# Patient Record
Sex: MALE | Race: WHITE | Employment: UNEMPLOYED | URBAN - METROPOLITAN AREA
[De-identification: names, ages, dates, MRNs, and addresses within clinical notes are randomized per-mention and may not be internally consistent; named-entity substitution may affect disease eponyms.]

---

## 2021-02-08 ENCOUNTER — OFFICE VISIT (OUTPATIENT)
Dept: FAMILY MEDICINE | Facility: CLINIC | Age: 36
End: 2021-02-08
Payer: COMMERCIAL

## 2021-02-08 ENCOUNTER — MYC MEDICAL ADVICE (OUTPATIENT)
Dept: FAMILY MEDICINE | Facility: CLINIC | Age: 36
End: 2021-02-08

## 2021-02-08 VITALS
RESPIRATION RATE: 20 BRPM | WEIGHT: 197.9 LBS | TEMPERATURE: 97 F | HEIGHT: 69 IN | BODY MASS INDEX: 29.31 KG/M2 | OXYGEN SATURATION: 96 % | SYSTOLIC BLOOD PRESSURE: 135 MMHG | HEART RATE: 92 BPM | DIASTOLIC BLOOD PRESSURE: 75 MMHG

## 2021-02-08 DIAGNOSIS — R35.0 URINARY FREQUENCY: ICD-10-CM

## 2021-02-08 DIAGNOSIS — Z00.00 ROUTINE GENERAL MEDICAL EXAMINATION AT A HEALTH CARE FACILITY: Primary | ICD-10-CM

## 2021-02-08 DIAGNOSIS — E66.3 OVERWEIGHT: ICD-10-CM

## 2021-02-08 DIAGNOSIS — R06.83 SNORING: ICD-10-CM

## 2021-02-08 DIAGNOSIS — F17.200 NICOTINE DEPENDENCE, UNCOMPLICATED, UNSPECIFIED NICOTINE PRODUCT TYPE: ICD-10-CM

## 2021-02-08 DIAGNOSIS — Z13.1 SCREENING FOR DIABETES MELLITUS: ICD-10-CM

## 2021-02-08 DIAGNOSIS — Z13.6 CARDIOVASCULAR SCREENING; LDL GOAL LESS THAN 160: ICD-10-CM

## 2021-02-08 DIAGNOSIS — G47.10 EXCESSIVE SLEEPINESS: ICD-10-CM

## 2021-02-08 DIAGNOSIS — R81 GLYCOSURIA: Primary | ICD-10-CM

## 2021-02-08 LAB
ALBUMIN UR-MCNC: NEGATIVE MG/DL
APPEARANCE UR: CLEAR
BILIRUB UR QL STRIP: NEGATIVE
COLOR UR AUTO: YELLOW
GLUCOSE UR STRIP-MCNC: 100 MG/DL
HGB UR QL STRIP: NEGATIVE
KETONES UR STRIP-MCNC: NEGATIVE MG/DL
LEUKOCYTE ESTERASE UR QL STRIP: NEGATIVE
NITRATE UR QL: NEGATIVE
PH UR STRIP: 7 PH (ref 5–7)
SOURCE: ABNORMAL
SP GR UR STRIP: 1.02 (ref 1–1.03)
UROBILINOGEN UR STRIP-ACNC: 0.2 EU/DL (ref 0.2–1)

## 2021-02-08 PROCEDURE — 87086 URINE CULTURE/COLONY COUNT: CPT | Performed by: FAMILY MEDICINE

## 2021-02-08 PROCEDURE — 80061 LIPID PANEL: CPT | Performed by: FAMILY MEDICINE

## 2021-02-08 PROCEDURE — 81003 URINALYSIS AUTO W/O SCOPE: CPT | Performed by: FAMILY MEDICINE

## 2021-02-08 PROCEDURE — 82947 ASSAY GLUCOSE BLOOD QUANT: CPT | Performed by: FAMILY MEDICINE

## 2021-02-08 PROCEDURE — 99213 OFFICE O/P EST LOW 20 MIN: CPT | Mod: 25 | Performed by: FAMILY MEDICINE

## 2021-02-08 PROCEDURE — 99406 BEHAV CHNG SMOKING 3-10 MIN: CPT | Performed by: FAMILY MEDICINE

## 2021-02-08 PROCEDURE — 36415 COLL VENOUS BLD VENIPUNCTURE: CPT | Performed by: FAMILY MEDICINE

## 2021-02-08 PROCEDURE — 99385 PREV VISIT NEW AGE 18-39: CPT | Performed by: FAMILY MEDICINE

## 2021-02-08 RX ORDER — VARENICLINE TARTRATE 1 MG/1
1 TABLET, FILM COATED ORAL 2 TIMES DAILY
Qty: 60 TABLET | Refills: 1 | Status: SHIPPED | OUTPATIENT
Start: 2021-03-10 | End: 2021-04-26

## 2021-02-08 ASSESSMENT — MIFFLIN-ST. JEOR: SCORE: 1805.11

## 2021-02-08 NOTE — PROGRESS NOTES
SUBJECTIVE:   CC: Feng Redding is an 35 year old male who presents for preventative health visit.     Patient has been advised of split billing requirements and indicates understanding: Yes  Healthy Habits:     Getting at least 3 servings of Calcium per day:  Yes    Bi-annual eye exam:  NO    Dental care twice a year:  NO    Sleep apnea or symptoms of sleep apnea:  Daytime drowsiness, Excessive snoring and Sleep apnea    Diet:  Regular (no restrictions)    Frequency of exercise:  None    Taking medications regularly:  Not Applicable    Barriers to taking medications:  Not applicable    Medication side effects:  Not applicable    PHQ-2 Total Score: 0    Additional concerns today:  Yes    Pt requesting referral for sleep study due to excessive snoring / daytime drowsiness/ witnessed apnea.     Pt also c/o urinary frequency/urgency for years, small amounts.  No burning.  No dysuria.     Would like to discuss nicotine cessation (chewing tobacco).  About 3/4 tin/day.  Interested in chantix since has heard good things.               Today's PHQ-2 Score:   PHQ-2 ( 1999 Pfizer) 2/8/2021   Q1: Little interest or pleasure in doing things 0   Q2: Feeling down, depressed or hopeless 0   PHQ-2 Score 0   Q1: Little interest or pleasure in doing things Not at all   Q2: Feeling down, depressed or hopeless Not at all   PHQ-2 Score 0       Abuse: Current or Past(Physical, Sexual or Emotional)- No  Do you feel safe in your environment? Yes        Social History     Tobacco Use     Smoking status: Never Smoker     Smokeless tobacco: Current User     Types: Chew   Substance Use Topics     Alcohol use: Yes     If you drink alcohol do you typically have >3 drinks per day or >7 drinks per week? No    Alcohol Use 2/8/2021   Prescreen: >3 drinks/day or >7 drinks/week? No   Prescreen: >3 drinks/day or >7 drinks/week? -   No flowsheet data found.    Last PSA: No results found for: PSA    Reviewed orders with patient. Reviewed health  "maintenance and updated orders accordingly - Yes      Reviewed and updated as needed this visit by clinical staff  Tobacco  Allergies  Meds   Med Hx  Surg Hx  Fam Hx  Soc Hx        Reviewed and updated as needed this visit by Provider                History reviewed. No pertinent past medical history.   History reviewed. No pertinent surgical history.    Review of Systems  CONSTITUTIONAL: NEGATIVE for fever, chills, change in weight  INTEGUMENTARY/SKIN: NEGATIVE for worrisome rashes, moles or lesions  EYES: NEGATIVE for vision changes or irritation  ENT: NEGATIVE for ear, mouth and throat problems  RESP: NEGATIVE for significant cough or SOB  CV: NEGATIVE for chest pain, palpitations or peripheral edema  GI: NEGATIVE for nausea, abdominal pain, heartburn, or change in bowel habits   male: negative for dysuria, hematuria, decreased urinary stream, erectile dysfunction, urethral discharge  MUSCULOSKELETAL: NEGATIVE for significant arthralgias or myalgia  NEURO: NEGATIVE for weakness, dizziness or paresthesias  PSYCHIATRIC: NEGATIVE for changes in mood or affect    OBJECTIVE:   BP (!) 133/95 (Patient Position: Sitting, Cuff Size: Adult Regular)   Pulse 92   Temp 97  F (36.1  C) (Tympanic)   Resp 20   Ht 1.74 m (5' 8.5\")   Wt 89.8 kg (197 lb 14.4 oz)   SpO2 96%   BMI 29.65 kg/m      Physical Exam  GENERAL: healthy, alert and no distress  EYES: Eyes grossly normal to inspection, PERRL and conjunctivae and sclerae normal  HENT: ear canals and TM's normal, nose and mouth without ulcers or lesions  NECK: no adenopathy, no asymmetry, masses, or scars and thyroid normal to palpation  RESP: lungs clear to auscultation - no rales, rhonchi or wheezes  CV: regular rate and rhythm, normal S1 S2, no S3 or S4, no murmur, click or rub, no peripheral edema and peripheral pulses strong  ABDOMEN: soft, nontender, no hepatosplenomegaly, no masses and bowel sounds normal  MS: no gross musculoskeletal defects noted, no " edema  SKIN: no suspicious lesions or rashes  NEURO: Normal strength and tone, mentation intact and speech normal  PSYCH: mentation appears normal, affect normal/bright        ASSESSMENT/PLAN:   1. Routine general medical examination at a health care facility  Reviewed immunizations, exercise, weight.     2. Nicotine dependence, uncomplicated, unspecified nicotine product type  Reviewed comprehensive smoking cessation counseling today for 15 minutes total time in addition to remainder of visit.     Discussed comprehensive medication options risks/benefits of each.     No mental health problems so chantix a very good option.      Gave pt handout on using chantix, setting quit date, making quit plan, identifying triggers, etc.     Follow up as needed to troubleshoot.      - SMOKING CESSATION COUNSELING 3-10 MIN  - varenicline (CHANTIX STARTING MONTH PAK) 0.5 MG X 11 & 1 MG X 42 tablet; Continue therapy for 3 months.  Dispense: 53 tablet; Refill: 0  - varenicline (CHANTIX CONTINUING MONTH PAK) 1 MG tablet; Take 1 tablet (1 mg) by mouth 2 times daily  Dispense: 60 tablet; Refill: 1    3. Snoring  Definitely agree with sleep study.  Discussed impact on not only alertness but cardiovascular health/weight.   - SLEEP EVALUATION & MANAGEMENT REFERRAL - [facility] [phone] (Age 15 and up); Future    4. Excessive sleepiness    - SLEEP EVALUATION & MANAGEMENT REFERRAL - [facility] [phone] (Age 15 and up); Future    5. Urinary frequency  eval for diabetes/infection.  Decreasing nicoting (bladder irritant) will likely help. Discussed bladder re-training concepts as well.     - Glucose  - *UA reflex to Microscopic  - Urine Culture Aerobic Bacterial    6. Overweight    - Glucose    7. Screening for diabetes mellitus    - Glucose    8. CARDIOVASCULAR SCREENING; LDL GOAL LESS THAN 160    - Lipid panel reflex to direct LDL Fasting    Patient has been advised of  "split billing requirements and indicates understanding: Yes  COUNSELING:   Reviewed preventive health counseling, as reflected in patient instructions       Regular exercise       Healthy diet/nutrition    Estimated body mass index is 29.65 kg/m  as calculated from the following:    Height as of this encounter: 1.74 m (5' 8.5\").    Weight as of this encounter: 89.8 kg (197 lb 14.4 oz).     Weight management plan: Discussed healthy diet and exercise guidelines    He reports that he has never smoked. His smokeless tobacco use includes chew.      Counseling Resources:  ATP IV Guidelines  Pooled Cohorts Equation Calculator  FRAX Risk Assessment  ICSI Preventive Guidelines  Dietary Guidelines for Americans, 2010  USDA's MyPlate  ASA Prophylaxis  Lung CA Screening    Joel Daniel Wegener, MD  [facility]  "

## 2021-02-08 NOTE — PATIENT INSTRUCTIONS
"  Varenicline Tartrate (Chantix):  Chantix tablets Days                Dosage  0.5 mg                     1 through 3 0.5 mg once daily  0.5 mg                      4 through 7 0.5 mg twice daily  1.0 mg                     Day 8 to end  1 mg twice daily    Renal dosing:  For severe renal impairment, start at a dose of 0.5 mg daily and then titrate as needed to a maximum dose of 0.5 mg twice daily.  For patients with end-stage renal disease (ESRD) undergoing hemodialysis, a maximum dose of 0.5 mg daily may be administered if tolerated well.    How to take:  You will start taking Chantix one week before your quit date while you are still smoking. At first you will use the Chantix starter pack. Once you are finished with that, you will be on a \"maintenance dose\" that will probably stay the same for the rest of your treatment.  After a week of slowly increasing your dose, you will take Chantix twice a day. Take each dose after eating and with a full glass of water. Taking Chantix with food and water decreases nausea. That is also why you begin on a lower dose and slowly increase it. Once you begin the twice a day dosage, your 2 doses should be at least 8-10 hours apart and at least 4-5 hours before bedtime.      Duration of therapy: 12 weeks.  An additional course of 12 weeks of treatment is recommended if the first 12 weeks were successful to improve long term abstinence.    Adverse reactions:  The most common adverse reaction associated with varenicline treatment is nausea. Other common adverse effects include sleep disturbance, constipation, flatulence and vomiting.    There have been warnings from the FDA to be on the lookout for erratic behavior, suicidal ideation, or suicidal behavior.   There is one case report of a death, though it appears alcohol consumption may have played a part in that case.    Please report any behavior or mood changes as well as being aware of drowsiness.   Be cautious when operating " motor vehicles or dangerous machinery until the medication's effect on you is clear.    Nicotine Lozenge    Dosing:  >25 cigarettes a day Dose   Weeks 1-6 Use one 4 mg lozenge every 1-2 hours. Maximum 20 lozenges per day.   Weeks 7-9 Use one 4 mg lozenge every 2-4 hours. Maximum 20 lozenges per day.   Weeks 10-12 Use one 4 mg lozenge every 4-8 hours. Maximum 20 lozenges per day.   <25 cigarettes a day    Weeks 1-6 Use one 2 mg lozenge every 1-2 hours. Maximum 20 lozenges per day.   Weeks 7-9 Use one 4 mg lozenge every 2-4 hours. Maximum 20 lozenges per day.   Weeks 10-12 Use one 4 mg lozenge every 4-8 hours. Maximum 20 lozenges per day.     How to use the nicotine lozenge:    Place the lozenge in your mouth and allow it to slowly dissolve. It will be completely dissolved in about 20-30 minutes.     Do not chew or swallow the lozenge. Try to swallow as little as possible. It may help to hold the lozenge in the side of your mouth.     Occasionally move the lozenge from one side of your mouth to another.    You may feel a tingling sensation as you suck on the lozenge.    Especially at the beginning, use the lozenge whenever you would normally smoke a cigarette.    Some Tips:    Do not smoke while you are using the nicotine lozenge.     Do not use the nicotine lozenge like a normal lozenge. If you swallow, you will not absorb the nicotine and you may experience more side effects such as stomach upset.    Do not drink anything, even water, while you are using the lozenge or for 15 minutes before or after.    Do not use more than 1 lozenge at one time.    Do not use more than 5 lozenges in 6 hours.    Using at least 9 lozenges per day during your first 6 weeks will increase your chance of quitting.    As your body becomes less dependent on nicotine, you will need to use less lozenges.    Follow up with your health care professional if you have questions and also to help taper your nicotine lozenge dose.    Side  Effects:  Some people may experience some indigestion or nausea. Using the lozenge properly may help. If you experience any other troublesome or unusual side effects, call your health care professional.

## 2021-02-09 LAB
BACTERIA SPEC CULT: NO GROWTH
CHOLEST SERPL-MCNC: 265 MG/DL
GLUCOSE SERPL-MCNC: 98 MG/DL (ref 70–99)
HDLC SERPL-MCNC: 46 MG/DL
LDLC SERPL CALC-MCNC: 184 MG/DL
Lab: NORMAL
NONHDLC SERPL-MCNC: 219 MG/DL
SPECIMEN SOURCE: NORMAL
TRIGL SERPL-MCNC: 176 MG/DL

## 2021-02-12 ENCOUNTER — MYC MEDICAL ADVICE (OUTPATIENT)
Dept: FAMILY MEDICINE | Facility: CLINIC | Age: 36
End: 2021-02-12

## 2021-02-17 DIAGNOSIS — R81 GLYCOSURIA: ICD-10-CM

## 2021-02-17 DIAGNOSIS — R35.0 URINARY FREQUENCY: ICD-10-CM

## 2021-02-17 LAB
ALBUMIN SERPL-MCNC: 3.9 G/DL (ref 3.4–5)
ALP SERPL-CCNC: 85 U/L (ref 40–150)
ALT SERPL W P-5'-P-CCNC: 51 U/L (ref 0–70)
ANION GAP SERPL CALCULATED.3IONS-SCNC: 6 MMOL/L (ref 3–14)
AST SERPL W P-5'-P-CCNC: 24 U/L (ref 0–45)
BILIRUB SERPL-MCNC: 0.6 MG/DL (ref 0.2–1.3)
BUN SERPL-MCNC: 20 MG/DL (ref 7–30)
CALCIUM SERPL-MCNC: 9.3 MG/DL (ref 8.5–10.1)
CHLORIDE SERPL-SCNC: 107 MMOL/L (ref 94–109)
CO2 SERPL-SCNC: 26 MMOL/L (ref 20–32)
CREAT SERPL-MCNC: 1.04 MG/DL (ref 0.66–1.25)
GFR SERPL CREATININE-BSD FRML MDRD: >90 ML/MIN/{1.73_M2}
GLUCOSE SERPL-MCNC: 93 MG/DL (ref 70–99)
HBA1C MFR BLD: 5.2 % (ref 0–5.6)
POTASSIUM SERPL-SCNC: 4.4 MMOL/L (ref 3.4–5.3)
PROT SERPL-MCNC: 7.6 G/DL (ref 6.8–8.8)
SODIUM SERPL-SCNC: 139 MMOL/L (ref 133–144)

## 2021-02-17 PROCEDURE — 36415 COLL VENOUS BLD VENIPUNCTURE: CPT | Performed by: FAMILY MEDICINE

## 2021-02-17 PROCEDURE — 80053 COMPREHEN METABOLIC PANEL: CPT | Performed by: FAMILY MEDICINE

## 2021-02-17 PROCEDURE — 83036 HEMOGLOBIN GLYCOSYLATED A1C: CPT | Performed by: FAMILY MEDICINE

## 2021-03-14 ENCOUNTER — HEALTH MAINTENANCE LETTER (OUTPATIENT)
Age: 36
End: 2021-03-14

## 2021-03-16 VITALS — BODY MASS INDEX: 27.2 KG/M2 | HEIGHT: 70 IN | WEIGHT: 190 LBS

## 2021-03-16 ASSESSMENT — MIFFLIN-ST. JEOR: SCORE: 1793.08
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
## 2021-03-16 NOTE — PATIENT INSTRUCTIONS
"MY TREATMENT INFORMATION FOR SLEEP APNEA-  [place] [place]    DOCTOR : Janett Mercedes MD    Am I having a sleep study at a sleep center? Not ordered  --->Due to insurance clearance delays, you will be contacted within 2-4 weeks to schedule    Am I having a home sleep study? ordered  --->Watch the video for the device you are using:  /drop off device-   Https://www.Selfie.com.com/watch?v=yGGFBdELGhk  Disposable device sent out require phone/computer application-   https://www.Selfie.com.com/watch?v=BCce_vbiwxE      Frequently asked questions:  1. What is Obstructive Sleep Apnea (LESLIE)? LESLIE is the most common type of sleep apnea. Apnea means, \"without breath.\"  Apnea is most often caused by narrowing or collapse of the upper airway as muscles relax during sleep.   Almost everyone has occasional apneas. Most people with sleep apnea have had brief interruptions at night frequently for many years.  The severity of sleep apnea is related to how frequent and severe the events are.   2. What are the consequences of LESLIE? Symptoms include: feeling sleepy during the day, snoring loudly, gasping or stopping of breathing, trouble sleeping, and occasionally morning headaches or heartburn at night.  Sleepiness can be serious and even increase the risk of falling asleep while driving. Other health consequences may include development of high blood pressure and other cardiovascular disease in persons who are susceptible. Untreated LESLIE  can contribute to heart disease, stroke and diabetes.   3. What are the treatment options? In most situations, sleep apnea is a lifelong disease that must be managed with daily therapy. Medications are not effective for sleep apnea and surgery is generally not considered until other therapies have been tried. Your treatment is your choice . Continuous Positive Airway (CPAP) works right away and is the therapy that is effective in nearly everyone. An oral device to hold your jaw forward is " usually the next most reliable option. Other options include postioning devices (to keep you off your back), weight loss, and surgery including a tongue pacing device. There is more detail about some of these options below.  4. Are my sleep studies covered by insurance? Although we will request verification of coverage, we advise you also check in advance of the study to ensure there is coverage.    Important tips for those choosing CPAP and similar devices   Know your equipment:  CPAP is continuous positive airway pressure that prevents obstructive sleep apnea by keeping the throat from collapsing while you are sleeping. In most cases, the device is  smart  and can slowly self-adjusts if your throat collapses and keeps a record every day of how well you are treated-this information is available to you and your care team.  BPAP is bilevel positive airway pressure that keeps your throat open and also assists each breath with a pressure boost to maintain adequate breathing.  Special kinds of BPAP are used in patients who have inadequate breathing from lung or heart disease. In most cases, the device is  smart  and can slowly self-adjusts to assist breathing. Like CPAP, the device keeps a record of how well you are treated.  Your mask is your connection to the device. You get to choose what feels most comfortable and the staff will help to make sure if fits. Here: are some examples of the different masks that are available:       Key points to remember on your journey with sleep apnea:  1. Sleep study.  PAP devices often need to be adjusted during a sleep study to show that they are effective and adjusted right.  2. Good tips to remember: Try wearing just the mask during a quiet time during the day so your body adapts to wearing it. A humidifier is recommended for comfort in most cases to prevent drying of your nose and throat. Allergy medication from your provider may help you if you are having nasal  congestion.  3. Getting settled-in. It takes more than one night for most of us to get used to wearing a mask. Try wearing just the mask during a quiet time during the day so your body adapts to wearing it. A humidifier is recommended for comfort in most cases. Our team will work with you carefully on the first day and will be in contact within 4 days and again at 2 and 4 weeks for advice and remote device adjustments. Your therapy is evaluated by the device each day.   4. Use it every night. The more you are able to sleep naturally for 7-8 hours, the more likely you will have good sleep and to prevent health risks or symptoms from sleep apnea. Even if you use it 4 hours it helps. Occasionally all of us are unable to use a medical therapy, in sleep apnea, it is not dangerous to miss one night.   5. Communicate. Call our skilled team on the number provided on the first day if your visit for problems that make it difficult to wear the device. Over 2 out of 3 patients can learn to wear the device long-term with help from our team. Remember to call our team or your sleep providers if you are unable to wear the device as we may have other solutions for those who cannot adapt to mask CPAP therapy. It is recommended that you sleep your sleep provider within the first 3 months and yearly after that if you are not having problems.   6. Use it for your health. We encourage use of CPAP masks during daytime quiet periods to allow your face and brain to adapt to the sensation of CPAP so that it will be a more natural sensation to awaken to at night or during naps. This can be very useful during the first few weeks or months of adapting to CPAP though it does not help medically to wear CPAP during wakefulness and  should not be used as a strategy just to meet guidelines.  7. Take care of your equipment. Make sure you clean your mask and tubing using directions every day and that your filter and mask are replaced as recommended or  if they are not working.     BESIDES CPAP, WHAT OTHER THERAPIES ARE THERE?    Positioning Device  Positioning devices are generally used when sleep apnea is mild and only occurs on your back.This example shows a pillow that straps around the waist. It may be appropriate for those whose sleep study shows milder sleep apnea that occurs primarily when lying flat on one's back. Preliminary studies have shown benefit but effectiveness at home may need to be verified by a home sleep test. These devices are generally not covered by medical insurance.  Examples of devices that maintain sleeping on the back to prevent snoring and mild sleep apnea.    Belt type body positioner  http://charity: water/    Electronic reminder  http://nightshifttherapy.com/  http://www.SinCola.FitBark.au/      Oral Appliance  What is oral appliance therapy?  An oral appliance device fits on your teeth at night like a retainer used after having braces. The device is made by a specialized dentist and requires several visits over 1-2 months before a manufactured device is made to fit your teeth and is adjusted to prevent your sleep apnea. Once an oral device is working properly, snoring should be improved. A home sleep test may be recommended at that time if to determine whether the sleep apnea is adequately treated.       Some things to remember:  -Oral devices are often, but not always, covered by your medical insurance. Be sure to check with your insurance provider.   -If you are referred for oral therapy, you will be given a list of specialized dentists to consider or you may choose to visit the Web site of the American Academy of Dental Sleep Medicine  -Oral devices are less likely to work if you have severe sleep apnea or are extremely overweight.     More detailed information  An oral appliance is a small acrylic device that fits over the upper and lower teeth  (similar to a retainer or a mouth guard). This device slightly moves jaw forward, which  moves the base of the tongue forward, opens the airway, improves breathing for effective treat snoring and obstructive sleep apnea in perhaps 7 out of 10 people .  The best working devices are custom-made by a dental device  after a mold is made of the teeth 1, 2, 3.  When is an oral appliance indicated?  Oral appliance therapy is recommended as a first-line treatment for patients with primary snoring, mild sleep apnea, and for patients with moderate sleep apnea who prefer appliance therapy to use of CPAP4, 5. Severity of sleep apnea is determined by sleep testing and is based on the number of respiratory events per hour of sleep.   How successful is oral appliance therapy?  The success rate of oral appliance therapy in patients with mild sleep apnea is 75-80% while in patients with moderate sleep apnea it is 50-70%. The chance of success in patients with severe sleep apnea is 40-50%. The research also shows that oral appliances have a beneficial effect on the cardiovascular health of LESLIE patients at the same magnitude as CPAP therapy7.  Oral appliances should be a second-line treatment in cases of severe sleep apnea, but if not completely successful then a combination therapy utilizing CPAP plus oral appliance therapy may be effective. Oral appliances tend to be effective in a broad range of patients although studies show that the patients who have the highest success are females, younger patients, those with milder disease, and less severe obesity. 3, 6.   Finding a dentist that practices dental sleep medicine  Specific training is available through the American Academy of Dental Sleep Medicine for dentists interested in working in the field of sleep. To find a dentist who is educated in the field of sleep and the use of oral appliances, near you, visit the Web site of the American Academy of Dental Sleep Medicine.    References  1. janelle Roche al. Objectively measured vs self-reported compliance  during oral appliance therapy for sleep-disordered breathing. Chest 2013; 144(5): 1957-9650.  2. Reena, et al. Objective measurement of compliance during oral appliance therapy for sleep-disordered breathing. Thorax 2013; 68(1): 91-96.  3. Liana et al. Mandibular advancement devices in 620 men and women with LESLIE and snoring: tolerability and predictors of treatment success. Chest 2004; 125: 5497-4135.  4. Spenser et al. Oral appliances for snoring and LESLIE: a review. Sleep 2006; 29: 244-262.  5. Ani et al. Oral appliance treatment for LESLIE: an update. J Clin Sleep Med 2014; 10(2): 215-227.  6. Waldemar et al. Predictors of OSAH treatment outcome. J Dent Res 2007; 86: 5605-0052.      Weight Loss:    Weight loss is a long-term strategy that may improve sleep apnea in some patients.      Surgery:    Surgery for obstructive sleep apnea is considered generally only when other therapies fail to work. Surgery may be discussed with you if you are having a difficult time tolerating CPAP and or when there is an abnormal structure that requires surgical correction.  Nose and throat surgeries often enlarge the airway to prevent collapse.  Most of these surgeries create pain for 1-2 weeks and up to half of the most common surgeries are not effective throughout life.  You should carefully discuss the benefits and drawbacks to surgery with your sleep provider and surgeon to determine if it is the best solution for you.   More information  Surgery for LESLIE is directed at areas that are responsible for narrowing or complete obstruction of the airway during sleep.  There are a wide range of procedures available to enlarge and/or stabilize the airway to prevent blockage of breathing in the three major areas where it can occur: the palate, tongue, and nasal regions.  Successful surgical treatment depends on the accurate identification of the factors responsible for obstructive sleep apnea in each person.  A  personalized approach is required because there is no single treatment that works well for everyone.  Because of anatomic variation, consultation with an examination by a sleep surgeon is a critical first step in determining what surgical options are best for each patient.  In some cases, examination during sedation may be recommended in order to guide the selection of procedures.  Patients will be counseled about risks and benefits as well as the typical recovery course after surgery. Surgery is typically not a cure for a person s LESLIE.  However, surgery will often significantly improve one s LESLIE severity (termed  success rate ).  Even in the absence of a cure, surgery will decrease the cardiovascular risk associated with OSA7; improve overall quality of life8 (sleepiness, functionality, sleep quality, etc).      Palate Procedures:  Patients with LESLIE often have narrowing of their airway in the region of their tonsils and uvula.  The goals of palate procedures are to widen the airway in this region as well as to help the tissues resist collapse.  Modern palate procedure techniques focus on tissue conservation and soft tissue rearrangement, rather than tissue removal.  Often the uvula is preserved in this procedure. Residual sleep apnea is common in patient after pharyngoplasty with an average reduction in sleep apnea events of 33%2.      Tongue Procedures:  ExamWhile patients are awake, the muscles that surround the throat are active and keep this region open for breathing. These muscles relax during sleep, allowing the tongue and other structures to collapse and block breathing.  There are several different tongue procedures available.  Selection of a tongue base procedure depends on characteristics seen on physical exam.  Generally, procedures are aimed at removing bulky tissues in this area or preventing the back of the tongue from falling back during sleep.  Success rates for tongue surgery range from  50-62%3.    Hypoglossal Nerve Stimulation:  Hypoglossal nerve stimulation has recently received approval from the [place] Food and Drug Administration for the treatment of obstructive sleep apnea.  This is based on research showing that the system was safe and effective in treating sleep apnea6.  Results showed that the median AHI score decreased 68%, from 29.3 to 9.0. This therapy uses an implant system that senses breathing patterns and delivers mild stimulation to airway muscles, which keeps the airway open during sleep.  The system consists of three fully implanted components: a small generator (similar in size to a pacemaker), a breathing sensor, and a stimulation lead.  Using a small handheld remote, a patient turns the therapy on before bed and off upon awakening.    Candidates for this device must be greater than 22 years of age, have moderate to severe LESLIE (AHI between 20-65), BMI less than 32, have tried CPAP/oral appliance without success, and have appropriate upper airway anatomy (determined by a sleep endoscopy performed by Dr. Saldana).    Hypoglossal Nerve Stimulation Pathway:    The sleep surgeon s office will work with the patient through the insurance prior-authorization process (including communications and appeals).    Nasal Procedures:  Nasal obstruction can interfere with nasal breathing during the day and night.  Studies have shown that relief of nasal obstruction can improve the ability of some patients to tolerate positive airway pressure therapy for obstructive sleep apnea1.  Treatment options include medications such as nasal saline, topical corticosteroid and antihistamine sprays, and oral medications such as antihistamines or decongestants. Non-surgical treatments can include external nasal dilators for selected patients. If these are not successful by themselves, surgery can improve the nasal airway either alone or in combination with these other options.      Combination  Procedures:  Combination of surgical procedures and other treatments may be recommended, particularly if patients have more than one area of narrowing or persistent positional disease.  The success rate of combination surgery ranges from 66-80%2,3.    References  1. Jesus FULTON. The Role of the Nose in Snoring and Obstructive Sleep Apnoea: An Update.  Eur Arch Otorhinolaryngol. 2011; 268: 1365-73.  2.  Maurisio SM; Aurora JA; Sherry JR; Pallanch JF; Jon MB; Anthony SG; Juan DE LA ROSA. Surgical modifications of the upper airway for obstructive sleep apnea in adults: a systematic review and meta-analysis. SLEEP 2010;33(10):0744-9772. Dawn CROCKETT. Hypopharyngeal surgery in obstructive sleep apnea: an evidence-based medicine review.  Arch Otolaryngol Head Neck Surg. 2006 Feb;132(2):206-13.  3. Memo YH1, Micki Y, Oh ELIOT. The efficacy of anatomically based multilevel surgery for obstructive sleep apnea. Otolaryngol Head Neck Surg. 2003 Oct;129(4):327-35.  4. Dawn CROCKETT, Goldberg A. Hypopharyngeal Surgery in Obstructive Sleep Apnea: An Evidence-Based Medicine Review. Arch Otolaryngol Head Neck Surg. 2006 Feb;132(2):206-13.  5. Lay HANNA et al. Upper-Airway Stimulation for Obstructive Sleep Apnea.  N Engl J Med. 2014 Jan 9;370(2):139-49.  6. Karley Y et al. Increased Incidence of Cardiovascular Disease in Middle-aged Men with Obstructive Sleep Apnea. Am J Respir Crit Care Med; 2002 166: 159-165  7. Luis EM et al. Studying Life Effects and Effectiveness of Palatopharyngoplasty (SLEEP) study: Subjective Outcomes of Isolated Uvulopalatopharyngoplasty. Otolaryngol Head Neck Surg. 2011; 144: 623-631.              Your BMI is Body mass index is 27.26 kg/m .  Weight management is a personal decision.  If you are interested in exploring weight loss strategies, the following discussion covers the approaches that may be successful. Body mass index (BMI) is one way to tell whether you are at a healthy weight, overweight, or obese. It  measures your weight in relation to your height.  A BMI of 18.5 to 24.9 is in the healthy range. A person with a BMI of 25 to 29.9 is considered overweight, and someone with a BMI of 30 or greater is considered obese. More than two-thirds of American adults are considered overweight or obese.  Being overweight or obese increases the risk for further weight gain. Excess weight may lead to heart disease and diabetes.  Creating and following plans for healthy eating and physical activity may help you improve your health.  Weight control is part of healthy lifestyle and includes exercise, emotional health, and healthy eating habits. Careful eating habits lifelong are the mainstay of weight control. Though there are significant health benefits from weight loss, long-term weight loss with diet alone may be very difficult to achieve- studies show long-term success with dietary management in less than 10% of people. Attaining a healthy weight may be especially difficult to achieve in those with severe obesity. In some cases, medications, devices and surgical management might be considered.  What can you do?  If you are overweight or obese and are interested in methods for weight loss, you should discuss this with your provider.     Consider reducing daily calorie intake by 500 calories.     Keep a food journal.     Avoiding skipping meals, consider cutting portions instead.    Diet combined with exercise helps maintain muscle while optimizing fat loss. Strength training is particularly important for building and maintaining muscle mass. Exercise helps reduce stress, increase energy, and improves fitness. Increasing exercise without diet control, however, may not burn enough calories to loose weight.       Start walking three days a week 10-20 minutes at a time    Work towards walking thirty minutes five days a week     Eventually, increase the speed of your walking for 1-2 minutes at time    In addition, we recommend that  "you review healthy lifestyles and methods for weight loss available through the National Institutes of Health patient information sites:  http://win.niddk.nih.gov/publications/index.htm    And look into health and wellness programs that may be available through your health insurance provider, employer, local community center, or [employer].    Weight management plan: Patient was referred to their PCP to discuss a diet and exercise plan.    STRESS AND SLEEP    Its no surprise that stress can affect sleep. It is also true that if you sleep better you can also handle stress better.  If left untreated, sleep disorders are also associated with increased risk of onset of depression and anxiety and other health risks.    During this disruptive and uncertain time in our lives due the coronavirus (COVID-19), it is especially important to tend to your emotional and physical wellbeing.   Here are a few tips and resources to consider:    1. Stay active    It s pretty well known that exercise is really good for both our physical and mental health. There s heaps of different types of exercise you can do from home, thanks to YouTube and apps.  Do what fits your needs and health circumstances.    2. Practice good sleep habits      Keep a consistent sleep schedule and allow 7-8 hours for sleep.    Only use your bed for sleep and sex.    Wind down before bed.    Avoid using electronics and mobile devices an hour before bedtime or in bed.    Avoid caffeine use within six hours of your bedtime.     Avoid alcohol use within three hours of your bedtime.    2. Take 5 to chill.    When we re stressed about something (such as coronavirus), our thoughts, heart-rate and breathing tend to speed up. Taking even 5 minutes or so to practice mindfulness can help produce a sense of calmness. What the heck is \"mindfulness\" you ask?  Basically it is any activity, be it meditation, sitting in a relaxed position and taking in the experience and " "sensations of the present moment, or enjoying relaxed breathing.    If you are experiencing insomnia, the free [employer]  mobile obi has great relaxation tools in their Tools section including:      Breathing Tool    Progressive Muscle Relaxation    Body Scan    Mindfulness Exercise    Guided Imagery ([place], [place] or [place])    Insight Timer and Calm apps have thousands of free guided meditations and exercise on a variety of topics that may be of interest such as managing stress, sleep, and meditation.    3. Chat with your friends and family.    Even if an in-person meet-up is off the table, try to stay in touch via text, Skype, Messenger, BigEvidencepp, ModeWalk, or phone call. Ask them how they re feeling and share your own experience if you feel safe to do so.    5. Take a break from the news    Between the news and social media, we are all feeling saturated by news updates and \"breaking news.\"  It s important to stay informed, but try to limit your media intake to a couple of times a day and use trusted news sources. If you catch yourself turning to social media because you re feeling isolated, take a break and spend time on another activity.    6. Listen to Music    Music can make us feel so much better. If you don't have a music playlist on your phone, consider making one.     7. Watch or read something uplifting    Distraction can be a good thing. Watch something that you find uplifting and allow yourself to zone out from what s going on in the world. [employer] is a great option too.     8. Learn something new    Have you wanted to get into drawing or learning a musical instrument? Now s a great time to make a start. [employer] has great free online tutorials for pretty much everything.    Helpful Advice from the Sleep Foundation    Copy/paste or type into your browser:    www.sleepfoundation.org/sleep-guidelines-covid-19-isolation      Stress Management and Relaxation Books      The Relaxation and Stress " Reduction Workbook by Rosie Quinn and Gama Lugo (2008)    Stress Management Workbook: Techniques and Self-Assessment Procedures by Adelita Dykes and Segun Dias (1997)    A Mindfulness-Based Stress Reduction Workbook by Mart Rivera and Dianne Hensley (2010)    The Complete Stress Management Workbook by Johnson Escamilla, Jens Lopez and Brain Bunch (1996)

## 2021-03-17 ENCOUNTER — VIRTUAL VISIT (OUTPATIENT)
Dept: SLEEP MEDICINE | Facility: CLINIC | Age: 36
End: 2021-03-17
Attending: FAMILY MEDICINE
Payer: COMMERCIAL

## 2021-03-17 DIAGNOSIS — G47.09 OTHER INSOMNIA: ICD-10-CM

## 2021-03-17 DIAGNOSIS — E66.3 OVERWEIGHT (BMI 25.0-29.9): ICD-10-CM

## 2021-03-17 DIAGNOSIS — Z91.89 RISK FACTORS FOR OBSTRUCTIVE SLEEP APNEA: ICD-10-CM

## 2021-03-17 DIAGNOSIS — G47.10 EXCESSIVE SLEEPINESS: Primary | ICD-10-CM

## 2021-03-17 DIAGNOSIS — R06.83 SNORING: ICD-10-CM

## 2021-03-17 PROCEDURE — 99204 OFFICE O/P NEW MOD 45 MIN: CPT | Mod: 95 | Performed by: INTERNAL MEDICINE

## 2021-03-17 SDOH — HEALTH STABILITY: MENTAL HEALTH: HOW MANY STANDARD DRINKS CONTAINING ALCOHOL DO YOU HAVE ON A TYPICAL DAY?: 1 OR 2

## 2021-03-17 SDOH — HEALTH STABILITY: MENTAL HEALTH: HOW OFTEN DO YOU HAVE A DRINK CONTAINING ALCOHOL?: 2-3 TIMES A WEEK

## 2021-03-17 SDOH — HEALTH STABILITY: MENTAL HEALTH: HOW OFTEN DO YOU HAVE 6 OR MORE DRINKS ON ONE OCCASION?: NEVER
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
## 2021-03-23 ENCOUNTER — TELEPHONE (OUTPATIENT)
Dept: SLEEP MEDICINE | Facility: CLINIC | Age: 36
End: 2021-03-23

## 2021-04-14 ENCOUNTER — OFFICE VISIT (OUTPATIENT)
Dept: SLEEP MEDICINE | Facility: CLINIC | Age: 36
End: 2021-04-14
Attending: INTERNAL MEDICINE
Payer: COMMERCIAL

## 2021-04-14 DIAGNOSIS — G47.33 OSA (OBSTRUCTIVE SLEEP APNEA): ICD-10-CM

## 2021-04-14 DIAGNOSIS — E66.3 OVERWEIGHT (BMI 25.0-29.9): ICD-10-CM

## 2021-04-14 DIAGNOSIS — G47.10 EXCESSIVE SLEEPINESS: ICD-10-CM

## 2021-04-14 DIAGNOSIS — Z91.89 RISK FACTORS FOR OBSTRUCTIVE SLEEP APNEA: ICD-10-CM

## 2021-04-14 DIAGNOSIS — R06.83 SNORING: ICD-10-CM

## 2021-04-14 DIAGNOSIS — G47.09 OTHER INSOMNIA: ICD-10-CM

## 2021-04-14 PROCEDURE — G0399 HOME SLEEP TEST/TYPE 3 PORTA: HCPCS | Performed by: INTERNAL MEDICINE

## 2021-04-15 ENCOUNTER — APPOINTMENT (OUTPATIENT)
Dept: SLEEP MEDICINE | Facility: CLINIC | Age: 36
End: 2021-04-15
Payer: COMMERCIAL

## 2021-04-15 NOTE — PROGRESS NOTES
HST POST-STUDY QUESTIONNAIRE    1. What time did you go to bed?  11:15p  2. How long do you think it took to fall asleep?  20-30 minutes  3. What time did you wake up to start the day?  7am  4. Did you get up during the night at all?  yes  5. If you woke up, do you remember approximately what time(s)? 4:30a  6. Did you have any difficulty with the equipment?  No  7. Did you us any type of treatment with this study?  None  8. Was the head of the bed elevated? No  9. Did you sleep in a recliner?  No  10. Did you stop using CPAP at least 3 days before this test?  NA  11. Any other information you'd like us to know? no

## 2021-04-15 NOTE — PROGRESS NOTES
This HSAT was performed using a Noxturnal T3 device which recorded snore, sound, movement activity, body position, nasal pressure, oronasal thermal airflow, pulse, oximetry and both chest and abdominal respiratory effort. HSAT data was restricted to the time patient states they were in bed.     HSAT was scored using 1B 4% hypopnea rule.     HST AHI (Non-PAT): 17.3  Snoring was reported as moderate.  Time with SpO2 below 89% was 3.6 minutes.   Overall signal quality was good     Pt will follow up with sleep provider to determine appropriate therapy.

## 2021-04-16 PROBLEM — G47.33 OSA (OBSTRUCTIVE SLEEP APNEA): Status: ACTIVE | Noted: 2021-04-16

## 2021-04-16 NOTE — PROCEDURES
"HOME SLEEP STUDY INTERPRETATION    Patient: Feng Redding  MRN: [id number]  YOB: 1985  Study Date: 4/14/2021  Referring Provider: Joel Wegener, MD  Ordering Provider: Janett Mercedes MD     Indications for Home Study: Feng Redding is a 35 year old male with a history of good general health who presents with symptoms suggestive of obstructive sleep apnea.    Estimated body mass index is 27.26 kg/m  as calculated from the following:    Height as of 3/16/21: 1.778 m (5' 10\").    Weight as of 3/16/21: 86.2 kg (190 lb).  Total score - [place]: 11 (3/14/2021 11:22 AM)  StopBang Total Score: 4 (3/16/2021 11:00 AM)    Data: A full night home sleep study was performed recording the standard physiologic parameters including body position, movement, sound, nasal pressure, thermal oral airflow, chest and abdominal movements with respiratory inductance plethysmography, and oxygen saturation by pulse oximetry. Pulse rate was estimated by oximetry recording. This study was considered adequate based on > 4 hours of quality oximetry and respiratory recording. As specified by the AASM Manual for the Scoring of Sleep and Associated events, version 2.3, Rule VIII.D 1B, 4% oxygen desaturation scoring for hypopneas is used as a standard of care on all home sleep apnea testing.    Analysis Time:  432 minutes    Respiration:   Sleep Associated Hypoxemia: sustained hypoxemia was not present. Baseline oxygen saturation was 94%.  Time with saturation less than or equal to 88% was 1.3 minutes. The lowest oxygen saturation was 83%.   Snoring: Snoring was present.  Respiratory events: The home study revealed a presence of 37 obstructive apneas and 0 mixed and central apneas. There were 87 hypopneas resulting in a combined apnea/hypopnea index [AHI] of 17.3 events per hour.  AHI was 30 per hour supine, NA per hour prone, 8 per hour on left side, and 5.7 per hour on right side.   Pattern: Excluding events noted above, " respiratory rate and pattern was Normal.    Position: Percent of time spent: supine - 43%, prone - 0%, on left - 47%, on right - 10%.    Heart Rate: By pulse oximetry normal rate was noted.     Assessment:   Moderate obstructive sleep apnea.  Sleep associated hypoxemia was not present.    Recommendations:  Consider auto-CPAP at 5-15 cmH2O or oral appliance therapy.  Suggest optimizing sleep hygiene and avoiding sleep deprivation.  Weight management.    Diagnosis Code(s): Obstructive Sleep Apnea G47.33    Janett Mercedes MD, April 16, 2021   Diplomate, American Board of Internal Medicine, Sleep Medicine

## 2021-04-26 VITALS — WEIGHT: 183.38 LBS | BODY MASS INDEX: 26.25 KG/M2 | HEIGHT: 70 IN

## 2021-04-26 ASSESSMENT — MIFFLIN-ST. JEOR: SCORE: 1763.08

## 2021-04-26 NOTE — PATIENT INSTRUCTIONS
For general sleep health questions:   http://sleepeducation.org    For tips about PAP and COVID-19:  https://www.thoracic.org/patients/patient-resources/resources/covid-19-and-home-pap-therapy.pdf    For general info about COVID-19 including vaccines:  https://[employer].org/covid19        Continue PAP therapy every night, for all hours that you are sleeping (including naps.)  As always, try to get at least 8 hours of sleep or more each day, keep a regular sleep schedule, and avoid sleep deprivation. Avoid alcohol.    Reasons that you might need a change to your pressure therapy would be weight gain or loss, waking having inadvertently removed your PAP overnight, having previously felt refreshed by sleep with CPAP use and now waking un-refreshed, and return of daytime sleepiness. Also, the development of new medical problems  (such as heart failure, stroke, medications such as narcotics) can sometimes affect breathing at night and change your PAP therapy needs.    Please bring PAP with you if you are hospitalized.  If anticipating surgery be sure to discuss with your surgeon that you have sleep apnea and use PAP therapy.      Maintain your equipment as recommended which includes routine cleaning and replacement of supplies.      Call DME for any questions regarding supplies or maintenance.    [facility], [facility] [phone]      Do not drive on engage in potentially dangerous activities if feeling sleepy.    Please follow up in sleep clinic again in 2 months.        Tips for your PAP use-    Mask fitting tips  Mask fitting exercise:    To improve your mask seal and your mobility at night, put mask on and secure in place.  Lie down in bed with full pressure and roll to one side, adjust headgear while in that position to eliminate any leaks. Repeat process rolling to other side.     The mask seal does not have to be perfect:   CPAP machines are designed to make up  for small leaks. However, you will not tolerate leaks blowing in your eyes so you will need to adjust.   Any leak should only be near or at the bottom of the mask.  We expect your mask to leak slightly at night.    Do not over-tighten the headgear straps, tighter IS NOT better, we expect minimal leak.    First try re-positioning the mask or headgear before tightening the headgear straps.  Mask leaks are expected due to changing sleeping positions. Try pulling the mask away from your skin allowing the cushion to re-inflate will minimize the leak.  If you struggle for a good fit, try turning the CPAP off and then readjust the mask by pulling it away from your face and then turning back on the CPAP.        Humidifier tips  Humidifiers can be adjusted to increase or decrease the amount of moisture according to your comfort level. You may need to adjust this frequently at first, but then might only change it with seasonal weather changes.     Try INCREASING the humidity if:  You experience a dry, irritated nasal passage or throat.  You have a runny, drippy nose or sneezing fits after using CPAP.  You experience nasal congestion during or after CPAP use.    Try DECREASING the humidity if:  You have excessive condensation or  rain out  in the tubing or mask.  Otherwise keep the tubing warm during the night by running it underneath the blankets or pillow.      Clinic visit after initial PAP set-up   Bring your equipment with you to your 5-8 week follow up clinic visit.  We will be extracting your data from the machine if not available from the cloud based modem.        Travel  Always take your equipment with you when you travel.  If you fly with your equipment bring it on with you as a carry on.  Medical equipment does not count as a carry on.    If you travel international the machines take 110-240v.  The only adapter needed is the adapter that will fit into the receptacle (outlet).    You may also want to bring an  extension cord as many hotel rooms have limited outlets at the bedside.  Do not travel with water in your humidifier chamber.     Cleaning and Maintenance Guidelines    Equipment Frequency Cleaning Method   Mask First Day    Daily      Weekly Soak mask in hot soapy water for 30 minutes, rinse and air dry.  Wipe nasal cushion with a hot soapy (Ivory, baby shampoo) cloth and rinse.  Baby wipes may also be used.  Do not use anti-bacterial soaps,Jaylene  liquid soap, rubbing alcohol, bleach or ammonia.  Wash frame in hot soapy water (Ivory, baby shampoo) rinse and let air dry   Headgear Biweekly Wash in hot soapy water, rinse and air dry   Reusable Gray Filter Weekly Wash in hot soapy water, rinse, put in towel squeeze moisture out, let air dry   Disposable White Filter Check Weekly Replace when brown or gray in color; at least every 2 to 3 months   Humidifier Chamber Daily    Weekly Empty distilled water from humidifier and let air dry    Hand wash in hot soapy water, rinse and air dry   Tubing Weekly Wash in hot soapy water, rinse and let air dry   Mask, Tubing and Humidifier Chamber As needed Disinfect: Soak in 1 part distilled white vinegar to 3 parts hot water for 30 minutes, rinse well and air dry  Not the material headgear        MASK AND SUPPLY REORDERING and EQUIPMENT NEEDS through your DME and per your insurance  Reminder: Most insurance companies will allow for a new mask, headgear, tubing, and reusable gray filter every six months.  Disposable white ultra-fine filters are covered monthly.      HOME AND SAFETY INSTRUCTIONS    Do not use frayed or cracked electrical cords, multi plug adaptors, or switched receptacles    Do not immerse electrical equipment into water    Assure that electrical cords do not become a tripping hazard      Your BMI is Body mass index is 26.31 kg/m .  Weight management is a personal decision.  If you are interested in exploring weight loss strategies, the following discussion covers the  approaches that may be successful. Body mass index (BMI) is one way to tell whether you are at a healthy weight, overweight, or obese. It measures your weight in relation to your height.  A BMI of 18.5 to 24.9 is in the healthy range. A person with a BMI of 25 to 29.9 is considered overweight, and someone with a BMI of 30 or greater is considered obese. More than two-thirds of American adults are considered overweight or obese.  Being overweight or obese increases the risk for further weight gain. Excess weight may lead to heart disease and diabetes.  Creating and following plans for healthy eating and physical activity may help you improve your health.  Weight control is part of healthy lifestyle and includes exercise, emotional health, and healthy eating habits. Careful eating habits lifelong are the mainstay of weight control. Though there are significant health benefits from weight loss, long-term weight loss with diet alone may be very difficult to achieve- studies show long-term success with dietary management in less than 10% of people. Attaining a healthy weight may be especially difficult to achieve in those with severe obesity. In some cases, medications, devices and surgical management might be considered.  What can you do?  If you are overweight or obese and are interested in methods for weight loss, you should discuss this with your provider.     Consider reducing daily calorie intake by 500 calories.     Keep a food journal.     Avoiding skipping meals, consider cutting portions instead.    Diet combined with exercise helps maintain muscle while optimizing fat loss. Strength training is particularly important for building and maintaining muscle mass. Exercise helps reduce stress, increase energy, and improves fitness. Increasing exercise without diet control, however, may not burn enough calories to loose weight.       Start walking three days a week 10-20 minutes at a time    Work towards walking  thirty minutes five days a week     Eventually, increase the speed of your walking for 1-2 minutes at time    In addition, we recommend that you review healthy lifestyles and methods for weight loss available through the National Institutes of Health patient information sites:  http://win.niddk.nih.gov/publications/index.htm    And look into health and wellness programs that may be available through your health insurance provider, employer, local community center, or [employer].    Weight management plan: Patient was referred to their PCP to discuss a diet and exercise plan.

## 2021-04-26 NOTE — PROGRESS NOTES
Feng is a 35 year old who is being evaluated via a billable video visit.      How would you like to obtain your AVS? MyChart  If the video visit is dropped, the invitation should be resent by: Send to e-mail at: [email or web address]  Will anyone else be joining your video visit? No        Video-Visit Details    Type of service:  Video Visit    Video Start Time: 9:03 AM    Video End Time: 9:15 AM    Originating Location (pt. Location): Home    Distant Location (provider location):  [facility] Office    [facility]     Chief complaint:Follow up Home Sleep Apnea testing    History of Present Illness: 35-year-old gentleman with long history of snoring and observed apnea.  He reported it difficult to initiate sleep frequently.  It can often take him anywhere from 20 minutes to an hour to fall asleep.  Typical bedtime would be around 11 PM.  Once he does fall asleep he wakes up frequently at night.  At least once he will get up to go to the bathroom but other times he wakes up he is not sure why.    He underwent home sleep apnea testing and is here for those results.  He states that on the study he did feel a little annoyed occasionally with all the monitoring equipment but otherwise felt the night was generally representative.  The results of the home sleep apnea test were reviewed with him in detail.  They do show supine predominant obstructive sleep apnea with severe sleep apnea when supine.  Mild sleep apnea persists in the lateral positions.    He denies any new sleep concerns.  He has recently quit using oral tobacco and is now off of varenicline.  [place] Sleepiness Scale  Total score - [place]: 11 (3/14/2021 11:22 AM)  (Less than 10 normal)        History reviewed. No pertinent past medical history.    Allergies   Allergen Reactions     Apples [Apple]      Cherry      Peaches [Prunus Persica]      Pear        No current outpatient medications on  file.     No current facility-administered medications for this visit.        Social History     Socioeconomic History     Marital status:      Spouse name: Not on file     Number of children: Not on file     Years of education: Not on file     Highest education level: Not on file   Occupational History     Not on file   Social Needs     Financial resource strain: Not on file     Food insecurity     Worry: Not on file     Inability: Not on file     Transportation needs     Medical: Not on file     Non-medical: Not on file   Tobacco Use     Smoking status: Never Smoker     Smokeless tobacco: Former User     Types: Chew     Tobacco comment: Quit oral tobacco March 2021   Substance and Sexual Activity     Alcohol use: Yes     Frequency: 2-3 times a week     Drinks per session: 1 or 2     Binge frequency: Never     Drug use: Never     Sexual activity: Yes     Partners: Female   Lifestyle     Physical activity     Days per week: Not on file     Minutes per session: Not on file     Stress: Not on file   Relationships     Social connections     Talks on phone: Not on file     Gets together: Not on file     Attends [employer] service: Not on file     Active member of club or organization: Not on file     Attends meetings of clubs or organizations: Not on file     Relationship status: Not on file     Intimate partner violence     Fear of current or ex partner: Not on file     Emotionally abused: Not on file     Physically abused: Not on file     Forced sexual activity: Not on file   Other Topics Concern     Parent/sibling w/ CABG, MI or angioplasty before 65F 55M? Not Asked   Social History Narrative     Not on file       Family History   Problem Relation Age of Onset     Heart Disease Father      Allergies Father      Sleep Apnea Father      Substance Abuse Maternal Grandfather      Heart Disease Paternal Grandmother      Hypertension Paternal Grandmother      Heart Disease Paternal Grandfather      Hypertension  "Paternal Grandfather      Allergies Brother      Sleep Apnea Mother            EXAM:   1.778 m (5' 10\")   Wt 83.2 kg (183 lb 6.2 oz)   BMI 26.31 kg/m    GENERAL: Healthy, alert and no distress  EYES: Eyes grossly normal to inspection.  No discharge or erythema, or obvious scleral/conjunctival abnormalities.  RESP: No audible wheeze, cough, or visible cyanosis.  No visible retractions or increased work of breathing.    SKIN: Visible skin clear. No significant rash, abnormal pigmentation or lesions.  NEURO: Cranial nerves grossly intact.  Mentation and speech appropriate for age.  PSYCH: Mentation appears normal, affect normal/bright, judgement and insight intact, normal speech and appearance well-groomed.       HSAT 4/14/2021  Wt 190 lbs BMI 27.36  AHI 17.3 Lowest O2 sat 83%      ASSESSMENT:  35-year-old gentleman with history of some insomnia complaints, mild motor restlessness, overall moderate, supine predominant obstructive sleep apnea.    PLAN:  Discussed treatment options.  At this point we will proceed with CPAP.  He should be able to sleep in any position with this.  He will be enrolled in the sleep therapy management program.  Down the line he could consider oral appliance therapy and position restriction.  In the meantime he should also work on weight management.  He is agreeable with this plan.   We will follow-up approximately 2 months after being on CPAP.  If he continues to have residual issues with insomnia complaints consider referral for CBT-I.      27 minutes spent on the date of the encounter doing chart review, history and exam, documentation and further activities per the note    Janett Mercedes M.D.  Pulmonary/Critical Care/Sleep Medicine    [facility]   Floor 1, [address]   [address] [place] [address]. [place] [address]   Appointments: [phone]    The above note was dictated using voice recognition software and may include " typographical errors. Please contact the author for any clarifications.

## 2021-04-28 ENCOUNTER — VIRTUAL VISIT (OUTPATIENT)
Dept: SLEEP MEDICINE | Facility: CLINIC | Age: 36
End: 2021-04-28
Payer: COMMERCIAL

## 2021-04-28 DIAGNOSIS — G47.33 OSA (OBSTRUCTIVE SLEEP APNEA): Primary | ICD-10-CM

## 2021-04-28 PROCEDURE — 99213 OFFICE O/P EST LOW 20 MIN: CPT | Mod: 95 | Performed by: INTERNAL MEDICINE

## 2021-04-28 NOTE — LETTER
4/28/2021         RE: Feng Redding  [address]  [place] [address]        Dear Colleague,    Thank you for referring your patient, Feng Redding, to the [facility]. Please see a copy of my visit note below.    Feng is a 35 year old who is being evaluated via a billable video visit.      How would you like to obtain your AVS? MyChart  If the video visit is dropped, the invitation should be resent by: Send to e-mail at: [email or web address]  Will anyone else be joining your video visit? No        Video-Visit Details    Type of service:  Video Visit    Video Start Time: 9:03 AM    Video End Time: 9:15 AM    Originating Location (pt. Location): Home    Distant Location (provider location):  [facility] Home Office    Platform used for Video Visit: [employer]     Chief complaint:Follow up Home Sleep Apnea testing    History of Present Illness: 35-year-old gentleman with long history of snoring and observed apnea.  He reported it difficult to initiate sleep frequently.  It can often take him anywhere from 20 minutes to an hour to fall asleep.  Typical bedtime would be around 11 PM.  Once he does fall asleep he wakes up frequently at night.  At least once he will get up to go to the bathroom but other times he wakes up he is not sure why.    He underwent home sleep apnea testing and is here for those results.  He states that on the study he did feel a little annoyed occasionally with all the monitoring equipment but otherwise felt the night was generally representative.  The results of the home sleep apnea test were reviewed with him in detail.  They do show supine predominant obstructive sleep apnea with severe sleep apnea when supine.  Mild sleep apnea persists in the lateral positions.    He denies any new sleep concerns.  He has recently quit using oral tobacco and is now off of varenicline.  [place] Sleepiness Scale  Total  score - [place]: 11 (3/14/2021 11:22 AM)  (Less than 10 normal)        History reviewed. No pertinent past medical history.    Allergies   Allergen Reactions     Apples [Apple]      Cherry      Peaches [Prunus Persica]      Pear        No current outpatient medications on file.     No current facility-administered medications for this visit.        Social History     Socioeconomic History     Marital status:      Spouse name: Not on file     Number of children: Not on file     Years of education: Not on file     Highest education level: Not on file   Occupational History     Not on file   Social Needs     Financial resource strain: Not on file     Food insecurity     Worry: Not on file     Inability: Not on file     Transportation needs     Medical: Not on file     Non-medical: Not on file   Tobacco Use     Smoking status: Never Smoker     Smokeless tobacco: Former User     Types: Chew     Tobacco comment: Quit oral tobacco March 2021   Substance and Sexual Activity     Alcohol use: Yes     Frequency: 2-3 times a week     Drinks per session: 1 or 2     Binge frequency: Never     Drug use: Never     Sexual activity: Yes     Partners: Female   Lifestyle     Physical activity     Days per week: Not on file     Minutes per session: Not on file     Stress: Not on file   Relationships     Social connections     Talks on phone: Not on file     Gets together: Not on file     Attends Yarsani service: Not on file     Active member of club or organization: Not on file     Attends meetings of clubs or organizations: Not on file     Relationship status: Not on file     Intimate partner violence     Fear of current or ex partner: Not on file     Emotionally abused: Not on file     Physically abused: Not on file     Forced sexual activity: Not on file   Other Topics Concern     Parent/sibling w/ CABG, MI or angioplasty before 65F 55M? Not Asked   Social History Narrative     Not on file       Family History   Problem  "Relation Age of Onset     Heart Disease Father      Allergies Father      Sleep Apnea Father      Substance Abuse Maternal Grandfather      Heart Disease Paternal Grandmother      Hypertension Paternal Grandmother      Heart Disease Paternal Grandfather      Hypertension Paternal Grandfather      Allergies Brother      Sleep Apnea Mother            EXAM:  Ht 1.778 m (5' 10\")   Wt 83.2 kg (183 lb 6.2 oz)   BMI 26.31 kg/m    GENERAL: Healthy, alert and no distress  EYES: Eyes grossly normal to inspection.  No discharge or erythema, or obvious scleral/conjunctival abnormalities.  RESP: No audible wheeze, cough, or visible cyanosis.  No visible retractions or increased work of breathing.    SKIN: Visible skin clear. No significant rash, abnormal pigmentation or lesions.  NEURO: Cranial nerves grossly intact.  Mentation and speech appropriate for age.  PSYCH: Mentation appears normal, affect normal/bright, judgement and insight intact, normal speech and appearance well-groomed.       HSAT 4/14/2021  Wt 190 lbs BMI 27.36  AHI 17.3 Lowest O2 sat 83%      ASSESSMENT:  35-year-old gentleman with history of some insomnia complaints, mild motor restlessness, overall moderate, supine predominant obstructive sleep apnea.    PLAN:  Discussed treatment options.  At this point we will proceed with CPAP.  He should be able to sleep in any position with this.  He will be enrolled in the sleep therapy management program.  Down the line he could consider oral appliance therapy and position restriction.  In the meantime he should also work on weight management.  He is agreeable with this plan.   We will follow-up approximately 2 months after being on CPAP.  If he continues to have residual issues with insomnia complaints consider referral for CBT-I.      27 minutes spent on the date of the encounter doing chart review, history and exam, documentation and further activities per the note    Janett Mercedes M.D.  Pulmonary/Critical " Care/Sleep Medicine    [facility]   Floor 1, [address]   [address] [place] [address]   Appointments: [phone]    The above note was dictated using voice recognition software and may include typographical errors. Please contact the author for any clarifications.                Again, thank you for allowing me to participate in the care of your patient.        Sincerely,        Janett Mercedes MD

## 2021-04-29 ENCOUNTER — MYC MEDICAL ADVICE (OUTPATIENT)
Dept: FAMILY MEDICINE | Facility: CLINIC | Age: 36
End: 2021-04-29

## 2021-05-06 ENCOUNTER — APPOINTMENT (OUTPATIENT)
Dept: SLEEP MEDICINE | Facility: CLINIC | Age: 36
End: 2021-05-06
Payer: COMMERCIAL

## 2021-05-06 ENCOUNTER — DOCUMENTATION ONLY (OUTPATIENT)
Dept: SLEEP MEDICINE | Facility: CLINIC | Age: 36
End: 2021-05-06

## 2021-05-06 NOTE — PROGRESS NOTES
Patient was offered choice of [facility].  Patient Feng Redding was set up at [facility]  on May 6, 2021. Patient received a Resmed AirSense 10 Auto. Pressures were set at 5-15 cm H2O.   Patient s ramp is 5 cm H2O for Auto and FLEX/EPR is EPR, 2.  Patient received a Resmed Mask name: Airfit N30i Nasal mask size Standard, heated tubing and heated humidifier.  Patient does need to meet compliance. Patient has a follow up tbd  Charu Díaz

## 2021-05-10 ENCOUNTER — DOCUMENTATION ONLY (OUTPATIENT)
Dept: SLEEP MEDICINE | Facility: CLINIC | Age: 36
End: 2021-05-10

## 2021-05-10 NOTE — PROGRESS NOTES
3 day Sleep therapy management telephone visit    Diagnostic AHI:   17.3  HST    Confirmed with patient at time of call- N/A Patient is still interested in STM service       Message left for patient to return call    Replacement device: No  STM ordered by provider: Yes    Objective data     Order Settings for PAP  CPAP min 5    CPAP max 15        Device settings from machine CPAP min 5.0     CPAP max 15.0      EPR Setting TWO    RESMED soft response  OFF     Assessment: No usage but has account in [employer]       Action plan: Patient to have 14 day STM visit. Patient has a follow up visit scheduled:   no, but is required by insurance for compliance     Total time spent on accessing and  interpreting remote patient PAP therapy data  10 minutes    Total time spent counseling, coaching  and reviewing PAP therapy data with patient  0 minutes    [id number] no

## 2021-05-21 ENCOUNTER — DOCUMENTATION ONLY (OUTPATIENT)
Dept: SLEEP MEDICINE | Facility: CLINIC | Age: 36
End: 2021-05-21

## 2021-05-21 NOTE — PROGRESS NOTES
14  DAY STM VISIT    Diagnostic AHI:  17.3 HST    Subjective measures:   Patient said things are going well. Feels like he is getting benefit from therapy.     Assessment: Pt meeting objective benchmarks.  Patient meeting subjective benchmarks.     Action plan: pt to have 30 day STM visit.      Device type: Auto-CPAP    PAP settings: CPAP min 5.0 cm  H20       CPAP max 15.0 cm  H20        95th% pressure 8.6 cm  H20        RESMED EPR level Setting: TWO    RESMED Soft response setting:  OFF    Mask type:  Nasal Mask    Objective measures: 14 day rolling measures      Compliance  71 %      Leak  6.22  lpm  last  upload      AHI 1.44   last  upload      Average number of minutes 296      Objective measure goal  Compliance   Goal >70%  Leak   Goal < 24 lpm  AHI  Goal < 5  Usage  Goal >240        Total time spent on accessing and interpreting remote patient PAP therapy data  3 minutes    Total time spent counseling, coaching  and reviewing PAP therapy data with patient  5 minutes    [id number]  [id number]  no (3 day STM)

## 2021-06-07 ENCOUNTER — DOCUMENTATION ONLY (OUTPATIENT)
Dept: SLEEP MEDICINE | Facility: CLINIC | Age: 36
End: 2021-06-07

## 2021-06-07 NOTE — PROGRESS NOTES
30 DAY STM VISIT    Diagnostic AHI:  17.3 HST    Data only recheck     Assessment: Pt meeting objective benchmarks.     Action plan: pt to have 6 month STM visit  Patient has scheduled a follow up visit with Dr. Mercedes on 7/01/21.   Device type: Auto-CPAP  PAP settings: CPAP min 5.0 cm  H20     CPAP max 15.0 cm  H20    95th% pressure 8.8 cm  H20      RESMED EPR level Setting: TWO    RESMED Soft response setting:  OFF  Mask type:  Nasal Mask  Objective measures: 14 day rolling measures      Compliance  71 %      Leak  6.46 lpm  last  upload      AHI 1.25   last  upload      Average number of minutes 326      Objective measure goal  Compliance   Goal >70%  Leak   Goal < 24 lpm  AHI  Goal < 5  Usage  Goal >240        Total time spent on accessing and interpreting remote patient PAP therapy data  1 minutes    Total time spent counseling, coaching  and reviewing PAP therapy data with patient  0 minutes     [id number] this call  [id number] no  at 3 or 14 day [facility]

## 2021-07-01 ENCOUNTER — VIRTUAL VISIT (OUTPATIENT)
Dept: SLEEP MEDICINE | Facility: CLINIC | Age: 36
End: 2021-07-01
Payer: COMMERCIAL

## 2021-07-01 VITALS — HEIGHT: 70 IN | BODY MASS INDEX: 27.92 KG/M2 | WEIGHT: 195 LBS

## 2021-07-01 DIAGNOSIS — G47.33 OSA (OBSTRUCTIVE SLEEP APNEA): Primary | ICD-10-CM

## 2021-07-01 DIAGNOSIS — E66.3 OVERWEIGHT (BMI 25.0-29.9): ICD-10-CM

## 2021-07-01 PROBLEM — R06.83 SNORING: Status: RESOLVED | Noted: 2021-03-17 | Resolved: 2021-07-01

## 2021-07-01 PROBLEM — G47.09 OTHER INSOMNIA: Status: RESOLVED | Noted: 2021-03-17 | Resolved: 2021-07-01

## 2021-07-01 PROBLEM — G47.10 EXCESSIVE SLEEPINESS: Status: RESOLVED | Noted: 2021-03-17 | Resolved: 2021-07-01

## 2021-07-01 PROCEDURE — 99213 OFFICE O/P EST LOW 20 MIN: CPT | Mod: 95 | Performed by: INTERNAL MEDICINE

## 2021-07-01 ASSESSMENT — MIFFLIN-ST. JEOR: SCORE: 1815.76

## 2021-07-01 NOTE — PATIENT INSTRUCTIONS
For general sleep health questions:   http://sleepeducation.org        Continue PAP therapy every night, for all hours that you are sleeping (including naps.)  As always, try to get at least 8 hours of sleep or more each day, keep a regular sleep schedule, and avoid sleep deprivation. Avoid alcohol.    Reasons that you might need a change to your pressure therapy would be weight gain or loss, waking having inadvertently removed your PAP overnight, having previously felt refreshed by sleep with CPAP use and now waking un-refreshed, and return of daytime sleepiness. Also, the development of new medical problems  (such as heart failure, stroke, medications such as narcotics) can sometimes affect breathing at night and change your PAP therapy needs.    Please bring PAP with you if you are hospitalized.  If anticipating surgery be sure to discuss with your surgeon that you have sleep apnea and use PAP therapy.      Maintain your equipment as recommended which includes routine cleaning and replacement of supplies.      Call DME for any questions regarding supplies or maintenance.    [facility], [facility] [phone]      Do not drive on engage in potentially dangerous activities if feeling sleepy.    Please follow up in sleep clinic again in 12 months.        Tips for your PAP use-    Mask fitting tips  Mask fitting exercise:    To improve your mask seal and your mobility at night, put mask on and secure in place.  Lie down in bed with full pressure and roll to one side, adjust headgear while in that position to eliminate any leaks. Repeat process rolling to other side.     The mask seal does not have to be perfect:   CPAP machines are designed to make up for small leaks. However, you will not tolerate leaks blowing in your eyes so you will need to adjust.   Any leak should only be near or at the bottom of the mask.  We expect your mask to leak slightly at night.    Do not  over-tighten the headgear straps, tighter IS NOT better, we expect minimal leak.    First try re-positioning the mask or headgear before tightening the headgear straps.  Mask leaks are expected due to changing sleeping positions. Try pulling the mask away from your skin allowing the cushion to re-inflate will minimize the leak.  If you struggle for a good fit, try turning the CPAP off and then readjust the mask by pulling it away from your face and then turning back on the CPAP.        Humidifier tips  Humidifiers can be adjusted to increase or decrease the amount of moisture according to your comfort level. You may need to adjust this frequently at first, but then might only change it with seasonal weather changes.     Try INCREASING the humidity if:  You experience a dry, irritated nasal passage or throat.  You have a runny, drippy nose or sneezing fits after using CPAP.  You experience nasal congestion during or after CPAP use.    Try DECREASING the humidity if:  You have excessive condensation or  rain out  in the tubing or mask.  Otherwise keep the tubing warm during the night by running it underneath the blankets or pillow.      Clinic visit after initial PAP set-up   Bring your equipment with you to your 5-8 week follow up clinic visit.  We will be extracting your data from the machine if not available from the cloud based modem.        Travel  Always take your equipment with you when you travel.  If you fly with your equipment bring it on with you as a carry on.  Medical equipment does not count as a carry on.    If you travel international the machines take 110-240v.  The only adapter needed is the adapter that will fit into the receptacle (outlet).    You may also want to bring an extension cord as many hotel rooms have limited outlets at the bedside.  Do not travel with water in your humidifier chamber.     Cleaning and Maintenance Guidelines    Equipment Frequency Cleaning Method   Mask First  Day    Daily      Weekly Soak mask in hot soapy water for 30 minutes, rinse and air dry.  Wipe nasal cushion with a hot soapy (Ivory, baby shampoo) cloth and rinse.  Baby wipes may also be used.  Do not use anti-bacterial soaps,Jaylene  liquid soap, rubbing alcohol, bleach or ammonia.  Wash frame in hot soapy water (Ivory, baby shampoo) rinse and let air dry   Headgear Biweekly Wash in hot soapy water, rinse and air dry   Reusable Gray Filter Weekly Wash in hot soapy water, rinse, put in towel squeeze moisture out, let air dry   Disposable White Filter Check Weekly Replace when brown or gray in color; at least every 2 to 3 months   Humidifier Chamber Daily    Weekly Empty distilled water from humidifier and let air dry    Hand wash in hot soapy water, rinse and air dry   Tubing Weekly Wash in hot soapy water, rinse and let air dry   Mask, Tubing and Humidifier Chamber As needed Disinfect: Soak in 1 part distilled white vinegar to 3 parts hot water for 30 minutes, rinse well and air dry  Not the material headgear        MASK AND SUPPLY REORDERING and EQUIPMENT NEEDS through your DME and per your insurance  Reminder: Most insurance companies will allow for a new mask, headgear, tubing, and reusable gray filter every six months.  Disposable white ultra-fine filters are covered monthly.      HOME AND SAFETY INSTRUCTIONS    Do not use frayed or cracked electrical cords, multi plug adaptors, or switched receptacles    Do not immerse electrical equipment into water    Assure that electrical cords do not become a tripping hazard      Your BMI is Body mass index is 27.98 kg/m .  Weight management is a personal decision.  If you are interested in exploring weight loss strategies, the following discussion covers the approaches that may be successful. Body mass index (BMI) is one way to tell whether you are at a healthy weight, overweight, or obese. It measures your weight in relation to your height.  A BMI of 18.5 to 24.9 is in  the healthy range. A person with a BMI of 25 to 29.9 is considered overweight, and someone with a BMI of 30 or greater is considered obese. More than two-thirds of American adults are considered overweight or obese.  Being overweight or obese increases the risk for further weight gain. Excess weight may lead to heart disease and diabetes.  Creating and following plans for healthy eating and physical activity may help you improve your health.  Weight control is part of healthy lifestyle and includes exercise, emotional health, and healthy eating habits. Careful eating habits lifelong are the mainstay of weight control. Though there are significant health benefits from weight loss, long-term weight loss with diet alone may be very difficult to achieve- studies show long-term success with dietary management in less than 10% of people. Attaining a healthy weight may be especially difficult to achieve in those with severe obesity. In some cases, medications, devices and surgical management might be considered.  What can you do?  If you are overweight or obese and are interested in methods for weight loss, you should discuss this with your provider.     Consider reducing daily calorie intake by 500 calories.     Keep a food journal.     Avoiding skipping meals, consider cutting portions instead.    Diet combined with exercise helps maintain muscle while optimizing fat loss. Strength training is particularly important for building and maintaining muscle mass. Exercise helps reduce stress, increase energy, and improves fitness. Increasing exercise without diet control, however, may not burn enough calories to loose weight.       Start walking three days a week 10-20 minutes at a time    Work towards walking thirty minutes five days a week     Eventually, increase the speed of your walking for 1-2 minutes at time      And look into health and wellness programs that may be available through your health insurance provider,  employer, local community center, or [employer].

## 2021-07-01 NOTE — PROGRESS NOTES
Feng is a 35 year old who is being evaluated via a billable video visit.      How would you like to obtain your AVS? MyChart  If the video visit is dropped, the invitation should be resent by: Send to e-mail at: [email or web address]  Will anyone else be joining your video visit? No     Nava CHRISKhanh WAGNER, SLEEP MEDICINE, 2021 10:40 AM      Video-Visit Details    Type of service:  Video Visit    Video Start Time: 1:01 PM     Video End Time:1:13 PM    Originating Location (pt. Location): Home    Distant Location (provider location):  [facility]    Platform used for Video Visit: Repairogen     Chief complaint: Follow-up recently diagnosed sleep apnea and CPAP initiation    History of Present Illness: 35-year-old gentleman with history of snoring daytime fatigue and observed apneas.  He was recently diagnosed with moderate obstructive sleep apnea by home sleep apnea testing.  He current he is using CPAP.  Initially after a couple of days he found immediate benefit.  He found that he was sleeping through the night and feeling more refreshed during the day.  However, the beginning of  they had a  daughter.  That has caused some disruption in his sleep.  He will fall  asleep without CPAP for a little bit and his wife notes immediate snoring.  However, on CPAP snoring is resolved.  He is using a nasal pillows style mask.  He has been experimenting with the sizes.  He is cleaning his supplies.    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  He thinks he is still drinking about 2-3 caffeinated beverages a day.  Denies any new major medical problems or medication changes.    [place] Sleepiness Scale  Total score - [place]: 7 (2021 12:25 PM)   Improved from 11 pre-CPAP  (Less than 10 normal)    Insomnia Severity Scale   FARAZ 5 (Improved from 24 pre-CPAP)  (normal 0-7, mild 8-14, moderate 15-21, severe 22-28)    Past Medical  History:   Diagnosis Date     LESLIE (obstructive sleep apnea) 2021       Allergies   Allergen Reactions     Apples [Apple]      Cherry      Peaches [Prunus Persica]      Pear        No current outpatient medications on file.     No current facility-administered medications for this visit.        Social History     Socioeconomic History     Marital status:      Spouse name: Not on file     Number of children: Not on file     Years of education: Not on file     Highest education level: Not on file   Occupational History     Not on file   Social Needs     Financial resource strain: Not on file     Food insecurity     Worry: Not on file     Inability: Not on file     Transportation needs     Medical: Not on file     Non-medical: Not on file   Tobacco Use     Smoking status: Never Smoker     Smokeless tobacco: Former User     Types: Chew     Tobacco comment: Quit oral tobacco March 2021   Substance and Sexual Activity     Alcohol use: Yes     Frequency: 2-3 times a week     Drinks per session: 1 or 2     Binge frequency: Never     Drug use: Never     Sexual activity: Yes     Partners: Female   Lifestyle     Physical activity     Days per week: Not on file     Minutes per session: Not on file     Stress: Not on file   Relationships     Social connections     Talks on phone: Not on file     Gets together: Not on file     Attends Latter day service: Not on file     Active member of club or organization: Not on file     Attends meetings of clubs or organizations: Not on file     Relationship status: Not on file     Intimate partner violence     Fear of current or ex partner: Not on file     Emotionally abused: Not on file     Physically abused: Not on file     Forced sexual activity: Not on file   Other Topics Concern     Parent/sibling w/ CABG, MI or angioplasty before 65F 55M? Not Asked   Social History Narrative     Not on file       Family History   Problem Relation Age of Onset     Heart Disease Father       "Allergies Father      Sleep Apnea Father      Substance Abuse Maternal Grandfather      Heart Disease Paternal Grandmother      Hypertension Paternal Grandmother      Heart Disease Paternal Grandfather      Hypertension Paternal Grandfather      Allergies Brother      Sleep Apnea Mother            EXAM:  Ht 1.778 m (5' 10\")   Wt 88.5 kg (195 lb)   BMI 27.98 kg/m    GENERAL: Alert and no distress  EYES: Eyes grossly normal to inspection.  No discharge or erythema, or obvious scleral/conjunctival abnormalities.  RESP: No audible wheeze, cough, or visible cyanosis.  No visible retractions or increased work of breathing.    SKIN: Visible skin clear. No significant rash, abnormal pigmentation or lesions.  NEURO: Cranial nerves grossly intact.  Mentation and speech appropriate for age.  PSYCH: Mentation appears normal, affect normal, judgement and insight intact, normal speech and appearance well-groomed.       HSAT 2021  Wt 190 lbs BMI 27.36  AHI 17.3 Lowest O2 sat 83%    ResMed   Auto-PAP 5.0 - 15.0 cmH2O 30 day usage data:    80% of days with > 4 hours of use.  days with no use.   Average use 330 minutes per day.   95%ile Leak 4.01 L/min.   CPAP 95% pressure 9 cm.   AHI 1.27 events per hour.     ASSESSMENT:  35-year-old gentleman with history of snoring and daytime sleepiness and sleep disruption diagnosed with moderate obstructive sleep apnea.  Symptoms have resolved.  Is getting excellent clinical benefit with normalization of AHI as well.  Currently sleep is disrupted by .    PLAN:  Patient is encouraged to continue to use CPAP every time he sleeps.  Should optimize sleep routine. Continue efforts at weight management.  We reviewed the importance of informing providers of his diagnosis should he go through any procedures requiring sedation or anesthesia.  We also reviewed reviewed the importance of cleaning and replacing supplies.  He should contact me with any sleep issues as they arise otherwise " follow-up with me in 1 year.  He is agreeable with this plan.      23 minutes spent on the date of the encounter doing chart review, history and exam, documentation and further activities per the note    Janett Mercedes M.D.  Pulmonary/Critical Care/Sleep Medicine    [facility]   Floor [address], [address]   [address] [place] [address]   Appointments: [phone]    The above note was dictated using voice recognition software and may include typographical errors. Please contact the author for any clarifications.

## 2021-07-01 NOTE — LETTER
2021         RE: Feng Redding  [address]  [place] [address]        Dear Colleague,    Thank you for referring your patient, Feng Redding, to the [facility]. Please see a copy of my visit note below.    Feng is a 35 year old who is being evaluated via a billable video visit.      How would you like to obtain your AVS? MyChart  If the video visit is dropped, the invitation should be resent by: Send to e-mail at: [email or web address]  Will anyone else be joining your video visit? Michelle FERNÁNDEZ CMA, SLEEP MEDICINE, 2021 10:40 AM      Video-Visit Details    Type of service:  Video Visit    Video Start Time: 1:01 PM     Video End Time:1:13 PM    Originating Location (pt. Location): Home    Distant Location (provider location):  [facility]    Platform used for Video Visit: Cearna     Chief complaint: Follow-up recently diagnosed sleep apnea and CPAP initiation    History of Present Illness: 35-year-old gentleman with history of snoring daytime fatigue and observed apneas.  He was recently diagnosed with moderate obstructive sleep apnea by home sleep apnea testing.  He current he is using CPAP.  Initially after a couple of days he found immediate benefit.  He found that he was sleeping through the night and feeling more refreshed during the day.  However, the beginning of  they had a  daughter.  That has caused some disruption in his sleep.  He will fall  asleep without CPAP for a little bit and his wife notes immediate snoring.  However, on CPAP snoring is resolved.  He is using a nasal pillows style mask.  He has been experimenting with the sizes.  He is cleaning his supplies.    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  He thinks he is still drinking about 2-3 caffeinated beverages a day.  Denies any new major medical problems or  medication changes.    [place] Sleepiness Scale  Total score - [place]: 7 (7/1/2021 12:25 PM)   Improved from 11 pre-CPAP  (Less than 10 normal)    Insomnia Severity Scale   FARAZ 5 (Improved from 24 pre-CPAP)  (normal 0-7, mild 8-14, moderate 15-21, severe 22-28)    Past Medical History:   Diagnosis Date     LESLIE (obstructive sleep apnea) 2021       Allergies   Allergen Reactions     Apples [Apple]      Cherry      Peaches [Prunus Persica]      Pear        No current outpatient medications on file.     No current facility-administered medications for this visit.        Social History     Socioeconomic History     Marital status:      Spouse name: Not on file     Number of children: Not on file     Years of education: Not on file     Highest education level: Not on file   Occupational History     Not on file   Social Needs     Financial resource strain: Not on file     Food insecurity     Worry: Not on file     Inability: Not on file     Transportation needs     Medical: Not on file     Non-medical: Not on file   Tobacco Use     Smoking status: Never Smoker     Smokeless tobacco: Former User     Types: Chew     Tobacco comment: Quit oral tobacco March 2021   Substance and Sexual Activity     Alcohol use: Yes     Frequency: 2-3 times a week     Drinks per session: 1 or 2     Binge frequency: Never     Drug use: Never     Sexual activity: Yes     Partners: Female   Lifestyle     Physical activity     Days per week: Not on file     Minutes per session: Not on file     Stress: Not on file   Relationships     Social connections     Talks on phone: Not on file     Gets together: Not on file     Attends Sikh service: Not on file     Active member of club or organization: Not on file     Attends meetings of clubs or organizations: Not on file     Relationship status: Not on file     Intimate partner violence     Fear of current or ex partner: Not on file     Emotionally abused: Not on file     Physically abused:  "Not on file     Forced sexual activity: Not on file   Other Topics Concern     Parent/sibling w/ CABG, MI or angioplasty before 65F 55M? Not Asked   Social History Narrative     Not on file       Family History   Problem Relation Age of Onset     Heart Disease Father      Allergies Father      Sleep Apnea Father      Substance Abuse Maternal Grandfather      Heart Disease Paternal Grandmother      Hypertension Paternal Grandmother      Heart Disease Paternal Grandfather      Hypertension Paternal Grandfather      Allergies Brother      Sleep Apnea Mother            EXAM:  Ht 1.778 m (5' 10\")   Wt 88.5 kg (195 lb)   BMI 27.98 kg/m    GENERAL: Alert and no distress  EYES: Eyes grossly normal to inspection.  No discharge or erythema, or obvious scleral/conjunctival abnormalities.  RESP: No audible wheeze, cough, or visible cyanosis.  No visible retractions or increased work of breathing.    SKIN: Visible skin clear. No significant rash, abnormal pigmentation or lesions.  NEURO: Cranial nerves grossly intact.  Mentation and speech appropriate for age.  PSYCH: Mentation appears normal, affect normal, judgement and insight intact, normal speech and appearance well-groomed.       HSAT 2021  Wt 190 lbs BMI 27.36  AHI 17.3 Lowest O2 sat 83%    ResMed   Auto-PAP 5.0 - 15.0 cmH2O 30 day usage data:    80% of days with > 4 hours of use. /30 days with no use.   Average use 330 minutes per day.   95%ile Leak 4.01 L/min.   CPAP 95% pressure 9 cm.   AHI 1.27 events per hour.     ASSESSMENT:  35-year-old gentleman with history of snoring and daytime sleepiness and sleep disruption diagnosed with moderate obstructive sleep apnea.  Symptoms have resolved.  Is getting excellent clinical benefit with normalization of AHI as well.  Currently sleep is disrupted by .    PLAN:  Patient is encouraged to continue to use CPAP every time he sleeps.  Should optimize sleep routine. Continue efforts at weight management.  We " reviewed the importance of informing providers of his diagnosis should he go through any procedures requiring sedation or anesthesia.  We also reviewed reviewed the importance of cleaning and replacing supplies.  He should contact me with any sleep issues as they arise otherwise follow-up with me in 1 year.  He is agreeable with this plan.      23 minutes spent on the date of the encounter doing chart review, history and exam, documentation and further activities per the note    Janett Mercedes M.D.  Pulmonary/Critical Care/Sleep Medicine    [facility]   [address]oor [address], [address]   [address] [place] [address]. [place] [address]   Appointments: [phone]    The above note was dictated using voice recognition software and may include typographical errors. Please contact the author for any clarifications.                      Again, thank you for allowing me to participate in the care of your patient.        Sincerely,        Janett Mercedes MD

## 2021-07-06 NOTE — PROGRESS NOTES
Assessment & Plan     1. Acute UTI  -stay well hydrated. Avoid caffine  - UA reflex to Microscopic and Culture  - Urine Microscopic  - Urine Culture Aerobic Bacterial  - cephALEXin (KEFLEX) 500 MG capsule; Take 1 capsule (500 mg) by mouth 2 times daily for 7 days  Dispense: 14 capsule; Refill: 0  Potential medication side effects were discussed with the patient; let me know if any occur.      2. Nocturia  avoid caffeine  And for unresolved concerns see urologist  - Adult Urology Referral; Future    3. Elevated BP without diagnosis of hypertension  -Normal  BP is  119/79 or lower. Upper number is systolic Blood pressure (SBP). Lower number is diastolic  Blood pressure (DBP)  -Blood pressure between 120-139/80-89 (prehypertensive blood pressure/ class 1 hypertension )   -Hypertensive is  BP of 140/90 or above and needs treatment with medication.  -life style changes that are beneficial to reach goal of normal Blood pressure   1.Weight loss of 1 kg can reduce systolic Blood pressure  (SBP) by 1 mmHg  2.Health healthy diet (eg DASH dietary pattern can reduce SBP by 11 mmHg)  3.Structured excercise program (150 mins aerobic activity/week can reduce SBP by 5 mmHg)  4.Low salt  diet - very important.(eg: cut by 255 or 1000 mg/day to reduce SBP by 5mmHg)  5. Increase 4-5 serving of fresh vegetables & fruits /day- good source of potassium.    Well done with smoke cessation  Reduction of alcohol     if More than 140/90  after a month of above life style changes  Return visit with MD/provider  for recheck & consideration of medications .           4. LESLIE (obstructive sleep apnea)  Keep up with CPAP use  Intentional weight loss with excercise and decrease dense calories intake  We also discussed to focus on whole food diet- including plant based, if paltable to him    The patient indicates understanding of these issues and agrees with the plan.      38 minutes spent on the date of the encounter doing chart review, history  "and exam, documentation and further activities per the note      Return in about 4 weeks (around 8/4/2021) for concerns,unresolved, BP Recheck/ HTN.    Dianelys Chopra MD  [facility]    Manda Still is a 35 year old who presents for the following health issues     HPI     Genitourinary - Male  Onset/Duration: on and off yrs . For years increased urge , always been abnormal   Flare up more around 2 weeks  Drinks at least 2-3 caffinated beverages a day  Seen by primary care physicain- 02/21-urine culture negative   Advised tobacco cessation and he managed it- tobacco free since March 2021.  Symptoms have persisted. No fever or flank pain. No gross hematuria but feels pinkish drop at the end of micturition.  Description:   Dysuria (painful urination): YES}  Hematuria (blood in urine): YES  Frequency: YES  Waking at night to urinate: YES- sometimes   Hesitancy (delay in urine): YES  Retention (unable to empty): YES  Decrease in urinary flow: YES  Incontinence: YES  Progression of Symptoms:  Comes in waves  and intermittent  Accompanying Signs & Symptoms:  Fever: no  Back/Flank pain: no  Urethral discharge: YES  Testicle lumps/masses/pain: no  Nausea and/or vomiting: no  Abdominal pain: no  History:   History of frequent UTI s: YES  History of kidney stones: no  History of hernias: no  Personal or Family history of Prostate problems: no  Sexually active: YES  Precipitating or alleviating factors: none   Therapies tried and outcome: none        Review of Systems Was seen for sleep study as well- diagnosed with sleep apnea and using CPAP since  2 months  Constitutional, HEENT, cardiovascular, pulmonary, GI, musculoskeletal, neuro, skin, endocrine and psych systems are negative, except as otherwise noted.      Objective    /85   Pulse 86   Temp 97.2  F (36.2  C) (Skin)   Resp 16   Ht 1.74 m (5' 8.5\")   Wt 94.8 kg (209 lb)   SpO2 97%   BMI 31.32 kg/m    Body mass index is 31.32 " kg/m .  Physical Exam   GENERAL: healthy, alert and no distress  NECK: no adenopathy, no asymmetry, masses, or scars and thyroid normal to palpation  RESP: lungs clear to auscultation - no rales, rhonchi or wheezes  CV: regular rate and rhythm, normal S1 S2, no S3 or S4, no murmur, click or rub, no peripheral edema and peripheral pulses strong  ABDOMEN: soft, nontender, no hepatosplenomegaly, no masses and bowel sounds normal  MS: no gross musculoskeletal defects noted, no edema    Results for orders placed or performed in visit on 07/07/21   UA reflex to Microscopic and Culture     Status: Abnormal    Specimen: Midstream Urine   Result Value Ref Range    Color Urine Yellow     Appearance Urine Clear     Glucose Urine Negative NEG^Negative mg/dL    Bilirubin Urine Negative NEG^Negative    Ketones Urine Negative NEG^Negative mg/dL    Specific Gravity Urine 1.025 1.003 - 1.035    Blood Urine Negative NEG^Negative    pH Urine 6.5 5.0 - 7.0 pH    Protein Albumin Urine Negative NEG^Negative mg/dL    Urobilinogen Urine 0.2 0.2 - 1.0 EU/dL    Nitrite Urine Negative NEG^Negative    Leukocyte Esterase Urine Small (A) NEG^Negative    Source Midstream Urine    Urine Microscopic     Status: Abnormal   Result Value Ref Range    WBC Urine 10-25 (A) OTO5^0 - 5 /HPF    RBC Urine O - 2 OTO2^O - 2 /HPF

## 2021-07-07 ENCOUNTER — OFFICE VISIT (OUTPATIENT)
Dept: FAMILY MEDICINE | Facility: CLINIC | Age: 36
End: 2021-07-07
Payer: COMMERCIAL

## 2021-07-07 VITALS
HEART RATE: 86 BPM | OXYGEN SATURATION: 97 % | RESPIRATION RATE: 16 BRPM | SYSTOLIC BLOOD PRESSURE: 138 MMHG | TEMPERATURE: 97.2 F | BODY MASS INDEX: 30.96 KG/M2 | DIASTOLIC BLOOD PRESSURE: 85 MMHG | WEIGHT: 209 LBS | HEIGHT: 69 IN

## 2021-07-07 DIAGNOSIS — N39.0 ACUTE UTI: Primary | ICD-10-CM

## 2021-07-07 DIAGNOSIS — R35.1 NOCTURIA: ICD-10-CM

## 2021-07-07 DIAGNOSIS — E66.811 OBESITY (BMI 30.0-34.9): ICD-10-CM

## 2021-07-07 DIAGNOSIS — R03.0 ELEVATED BP WITHOUT DIAGNOSIS OF HYPERTENSION: ICD-10-CM

## 2021-07-07 DIAGNOSIS — G47.33 OSA (OBSTRUCTIVE SLEEP APNEA): ICD-10-CM

## 2021-07-07 LAB
ALBUMIN UR-MCNC: NEGATIVE MG/DL
APPEARANCE UR: CLEAR
BILIRUB UR QL STRIP: NEGATIVE
COLOR UR AUTO: YELLOW
GLUCOSE UR STRIP-MCNC: NEGATIVE MG/DL
HGB UR QL STRIP: NEGATIVE
KETONES UR STRIP-MCNC: NEGATIVE MG/DL
LEUKOCYTE ESTERASE UR QL STRIP: ABNORMAL
NITRATE UR QL: NEGATIVE
PH UR STRIP: 6.5 PH (ref 5–7)
RBC #/AREA URNS AUTO: ABNORMAL /HPF
SOURCE: ABNORMAL
SP GR UR STRIP: 1.02 (ref 1–1.03)
UROBILINOGEN UR STRIP-ACNC: 0.2 EU/DL (ref 0.2–1)
WBC #/AREA URNS AUTO: ABNORMAL /HPF

## 2021-07-07 PROCEDURE — 81001 URINALYSIS AUTO W/SCOPE: CPT | Performed by: FAMILY MEDICINE

## 2021-07-07 PROCEDURE — 87086 URINE CULTURE/COLONY COUNT: CPT | Performed by: FAMILY MEDICINE

## 2021-07-07 PROCEDURE — 99214 OFFICE O/P EST MOD 30 MIN: CPT | Performed by: FAMILY MEDICINE

## 2021-07-07 RX ORDER — CEPHALEXIN 500 MG/1
500 CAPSULE ORAL 2 TIMES DAILY
Qty: 14 CAPSULE | Refills: 0 | Status: SHIPPED | OUTPATIENT
Start: 2021-07-07 | End: 2021-07-14

## 2021-07-07 ASSESSMENT — MIFFLIN-ST. JEOR: SCORE: 1855.46

## 2021-07-07 NOTE — PATIENT INSTRUCTIONS
1. Acute UTI  -stay well hydrated. Avoid caffine  - UA reflex to Microscopic and Culture  - Urine Microscopic  - Urine Culture Aerobic Bacterial  - cephALEXin (KEFLEX) 500 MG capsule; Take 1 capsule (500 mg) by mouth 2 times daily for 7 days  Dispense: 14 capsule; Refill: 0    2. Nocturia  avoid caffeine  And for unresolved concerns see urologist  - Adult Urology Referral; Future    3. Elevated BP without diagnosis of hypertension  -Normal  BP is  119/79 or lower. Upper number is systolic Blood pressure (SBP). Lower number is diastolic  Blood pressure (DBP)  -Blood pressure between 120-139/80-89 (prehypertensive blood pressure/ class 1 hypertension )   -Hypertensive is  BP of 140/90 or above and needs treatment with medication.  -life style changes that are beneficial to reach goal of normal Blood pressure   1.Weight loss of 1 kg can reduce systolic Blood pressure  (SBP) by 1 mmHg  2.Health healthy diet (eg DASH dietary pattern can reduce SBP by 11 mmHg)  3.Structured excercise program (150 mins aerobic activity/week can reduce SBP by 5 mmHg)  4.Low salt  diet - very important.(eg: cut by 255 or 1000 mg/day to reduce SBP by 5mmHg)  5. Increase 4-5 serving of fresh vegetables & fruits /day- good source of potassium.    Well done with smoke cessation  Reduction of alcohol     if More than 140/90  after a month of above life style changes  Return visit with MD/provider  for recheck & consideration of medications .           4. LESLIE (obstructive sleep apnea)  Keep up with CPAP use  Intentional weight loss

## 2021-07-08 LAB
BACTERIA SPEC CULT: NORMAL
Lab: NORMAL
SPECIMEN SOURCE: NORMAL

## 2021-07-29 NOTE — PROGRESS NOTES
Name: Feng Redding    MRN: [id number]   YOB: 1985                 Chief Complaint:   Urinary frequency         Assessment and Plan:   35 year old male with irritative and obstructive LUTS as well as some possible blood at the terminal end of voiding. Recent UA demonstrated pyuria but UC with only a low colony count of mixed  douglas. He had minimal improvement with antibiotics. Although he has no clear risk factors, there is some concern for urethral stricture or meatal stenosis based on history. Other differentials may including atypical infection, prostatitis, OAB, BPH, urolithiasis,  malignancy, versus other.  -Recommend cystourethroscopy to evaluate the urethra/bladder. Schedule with Dr. Agee next available.   -Further recommendations pending cystoscopic findings. If no obvious stricture or other obstruction, consider urodynamics.     Stefany Price PA-C  July 30, 2021          History of Present Illness:   Feng Redding is a 35 year old male with PMH significant for LESLIE and HTN seen today via virtual visit in consultation from Dr. Chopra for evaluation of urinary frequency. He describes a several year history of urinary frequency. Can go as often as every 30 minutes to a few hours at a time. He is typically up once at night to urinate. There is significant urgency as well as urge incontinence several times per week. In addition, he describes pain at the tip of the penis when urinating, significant slowing of his stream, taking a long time to void, and feeling of incomplete emptying. He describes feeling as though there is pressure from a fire hose trying to go through a tiny pinhole. This feeling is located in the distal urethra. About once per week, he also notices a pink discoloration at the end of his urine stream.    He was recently treated for a possible UTI with only minimal improvement in symptoms. UA demonstrated pyuria though UC returned with a low colony count of mixed   douglas. This was his first UTI. He denies concern for STIs (monogamous with his wife). No prior history of STIs. No history of kidney stones. No abdominal or back pain. He denies history of groin injury or trauma. No known family history of prostate issues.         Past Medical History:     Past Medical History:   Diagnosis Date     LESLIE (obstructive sleep apnea) 2021            Past Surgical History:   No past surgical history on file.         Social History:     Social History     Tobacco Use     Smoking status: Never Smoker     Smokeless tobacco: Former User     Types: Chew     Tobacco comment: Quit oral tobacco March 2021   Substance Use Topics     Alcohol use: Yes            Family History:     Family History   Problem Relation Age of Onset     Heart Disease Father      Allergies Father      Sleep Apnea Father      Substance Abuse Maternal Grandfather      Heart Disease Paternal Grandmother      Hypertension Paternal Grandmother      Heart Disease Paternal Grandfather      Hypertension Paternal Grandfather      Allergies Brother      Sleep Apnea Mother               Allergies:     Allergies   Allergen Reactions     Apples [Apple]      Cherry      Peaches [Prunus Persica]      Pear             Medications:     No current outpatient medications on file.     No current facility-administered medications for this visit.             Review of Systems:    ROS: 14 point ROS neg other than the symptoms noted above in the HPI and PMH.          Physical Exam:   GENERAL: Healthy, alert and no distress  EYES: Eyes grossly normal to inspection.  No discharge or erythema, or obvious scleral/conjunctival abnormalities.  RESP: No audible wheeze, cough, or visible cyanosis.  No visible retractions or increased work of breathing.    SKIN: Visible skin clear. No significant rash, abnormal pigmentation or lesions.  NEURO: Cranial nerves grossly intact.  Mentation and speech appropriate for age.  PSYCH: Mentation appears normal,  affect normal/bright, judgement and insight intact, normal speech and appearance well-groomed.        Labs:    7/7/21 - UA: small LE, 10-25 WBC --> UC: <10K mixed  douglas  2/8/21 - UA: 100 glucose, o/w negative --> UC: no growth    Creatinine   Date Value Ref Range Status   02/17/2021 1.04 0.66 - 1.25 mg/dL Final       Lab Results   Component Value Date    A1C 5.2 02/17/2021         Imaging:    None         Video-Visit Details    Type of service:  Video Visit    Video Start Time: 9:31 AM    Video End Time:9:46 AM    Originating Location (pt. Location): Home    Distant Location (provider location):  [facility]     Platform used for Video Visit: [employer]

## 2021-07-30 ENCOUNTER — VIRTUAL VISIT (OUTPATIENT)
Dept: UROLOGY | Facility: CLINIC | Age: 36
End: 2021-07-30
Attending: FAMILY MEDICINE
Payer: COMMERCIAL

## 2021-07-30 DIAGNOSIS — R39.198 SLOW URINARY STREAM: ICD-10-CM

## 2021-07-30 DIAGNOSIS — R35.0 URINARY FREQUENCY: Primary | ICD-10-CM

## 2021-07-30 DIAGNOSIS — R39.15 URINARY URGENCY: ICD-10-CM

## 2021-07-30 DIAGNOSIS — R31.0 GROSS HEMATURIA: ICD-10-CM

## 2021-07-30 DIAGNOSIS — R30.0 DYSURIA: ICD-10-CM

## 2021-07-30 PROCEDURE — 99203 OFFICE O/P NEW LOW 30 MIN: CPT | Mod: 95 | Performed by: PHYSICIAN ASSISTANT

## 2021-07-30 NOTE — PROGRESS NOTES
Feng Redding  who is being evaluated via a billable video visit.      How would you like to obtain your AVS? MyChart  If the video visit is dropped, the invitation should be resent by: Send to e-mail at: [email or web address]  Will anyone else be joining your video visit? No

## 2021-07-30 NOTE — PATIENT INSTRUCTIONS
UROLOGY CLINIC VISIT PATIENT INSTRUCTIONS    Cystoscopy with Dr. Rowdy Agee next available.    For pain management, you can continue to use ibuprofen or Tylenol. You can also try Azo which is available over the counter.    CYSTOSCOPY    What is a Cystoscopy?  This is a procedure done to check for problems inside the bladder.  Problems may include polyps (growths), tumors, inflammation (swelling and redness) and other concerns.    The doctor inserts a thin tube (called a cystoscope) into the bladder.  The tube is about the size of a pencil.  We will give you numbing medicine to reduce the pain or discomfort you may feel.    The tube allows the doctor to:  The doctor will be able to see inside the bladder by filling the bladder with water.  The water makes it easier to see any problems that may be present.    If needed, the doctor may use the tube to:  The doctor is able to take tissue samples (biopsies).  Samples are sent to the lab for testing.  The doctor can also burn off any small growths or tumors that are found.  This is call fulguration.    How should I get ready for the exam?  To prepare, stop taking any medications as instructed. Ask whether you should avoid eating or drinking anything after midnight before the procedure. Follow any other instructions your doctor gives you.    If you are having this procedure done at the clinic, you will be there for up to an hour.  You will receive care before and after the procedure.    Please tell your doctor if:  - You have a history of urinary tract infections.  - You know that you have a tumor in your bladder.  - You have bleeding problems.  - You have any allergies.  - You are or may be pregnant.      What happens after the exam?  You may go back to your normal diet and activity as you feel ready, unless your doctor tells you not to.    For the next two days, you may notice:  - Some blood in your urine.  - Some burning when you urinate (use the toilet).  - An urge to  urinate more often.  - Bladder spasms.    These are normal after the procedure. They should go away on their own after a day or two.      - You can help to relieve the above listed symptoms by:  - Drinking 6 to 8 large glasses of water each day (includes drinks at meals).  This will help clear the urine.  - Take warm baths to relieve pain and bladder spasms.  Do not add anything to the bath water.  - Your doctor may prescribe pain medicine.  You may also take Tylenol (acetaminophen) for pain.    When should I call my doctor?  - A fever over 100.0 F (38 C) for more than a day.  (Before you call the doctor, check your temperature under your tongue.)  - Chills.  - Failure to urinate: No urine comes out when you try to use the toilet.  (Try soaking in a bathtub full of warm water.  If still no urine, call your doctor.)  - A lot of blood in the urine or blood clots larger than a nickel.  - Pain in the back or abdomen (belly / stomach area).  - Pain or spasms that are not relieved by warm tub baths and pain medicine.  - Severe pain, burning or other problems while passing urine.  - Pain that gets worse after two days.        If you have any issues, questions or concerns in the meantime, do not hesitate to contact us at [phone] or via [email or web address].     It was a pleasure meeting with you today.  Thank you for allowing me and my team the privilege of caring for you today.  YOU are the reason we are here, and I truly hope we provided you with the excellent service you deserve.  Please let us know if there is anything else we can do for you so that we can be sure you are leaving completely satisfied with your care experience.

## 2021-08-10 ENCOUNTER — IMMUNIZATION (OUTPATIENT)
Dept: NURSING | Facility: CLINIC | Age: 36
End: 2021-08-10
Payer: COMMERCIAL

## 2021-08-10 PROCEDURE — 0001A PR COVID VAC PFIZER DIL RECON 30 MCG/0.3 ML IM: CPT

## 2021-08-10 PROCEDURE — 91300 PR COVID VAC PFIZER DIL RECON 30 MCG/0.3 ML IM: CPT

## 2021-08-26 ENCOUNTER — OFFICE VISIT (OUTPATIENT)
Dept: UROLOGY | Facility: CLINIC | Age: 36
End: 2021-08-26
Payer: COMMERCIAL

## 2021-08-26 DIAGNOSIS — N35.914 STRICTURE OF ANTERIOR URETHRA IN MALE, UNSPECIFIED STRICTURE TYPE: ICD-10-CM

## 2021-08-26 DIAGNOSIS — Z01.812 PRE-PROCEDURE LAB EXAM: Primary | ICD-10-CM

## 2021-08-26 DIAGNOSIS — R39.198 SLOW URINARY STREAM: ICD-10-CM

## 2021-08-26 DIAGNOSIS — R35.0 URINARY FREQUENCY: Primary | ICD-10-CM

## 2021-08-26 DIAGNOSIS — N39.0 ACUTE UTI: ICD-10-CM

## 2021-08-26 PROCEDURE — 52000 CYSTOURETHROSCOPY: CPT | Performed by: UROLOGY

## 2021-08-26 PROCEDURE — 99213 OFFICE O/P EST LOW 20 MIN: CPT | Mod: 25 | Performed by: UROLOGY

## 2021-08-26 NOTE — PROCEDURES
CYSTOSCOPY AND URETHRAL DILATION PROCEDURE NOTE:    Feng Redding is a 35 year old male  who presents with urinary frequency and urgency with a slow stream for cystoscopy.    Pt ID verified with patient: Yes     Procedure verified with patient: Yes     Procedure confirmed with physician and support staff: Yes     Consent form confirmed with physician and support staff.    Sign In  History and Physical Exam reviewed .  Informed Consent Discussed: Yes   Sign in Communication: Yes   Time Out:  Team Confirms the Correct Patient, Correct Procedure; Yes , Correct Site and Site Marking, Correct Position (if applicable).    Affirmation of Time Out: Yes   Sign Out:  Sign Out Discussion: Yes   Physician: Rowdy Agee MD    A urinalysis was performed revealing no evidence of infection.    The benefits, risks, alternatives of the cystoscopy procedure and personnel were discussed with the patient. The verbal consent was obtained and the patient agrees to proceed.      Description of procedure:   After fully informed, voluntary consent was obtained, the patient was brought into the procedure room, identified and placed in a supine position on the cystoscopy table.  The groin/scrotum were prepped with betadine and draped in a sterile fashion.  Urojet lidocaine gel was introduced.  There is evidence of a pinpoint opening at the meatus which required sequential dilation.  Following this flexible ureteroscopy was performed with complete visualization of the urethra and bladder.    Cystoscopic findings:  He was noted to have a pinpoint opening at the urethral meatus.  This required sequential dilation from 13 Telugu to 19 Telugu.  I intermittently instilled additional lidocaine gel and the patient tolerated the procedure, though with some discomfort.  The flexible cystoscope was unable to advance into the urethra.  The remainder of the urethra was normal without strictures.  The prostate was 2cm long and demonstrated no bilobar  hypertrophy.  There was no median lobe.  The external sphincter coapted normally and the bladder neck was normal. The bladder was  entered and careful pan endoscopy was carried out. The posterior, superior and lateral walls and dome of the bladder were all well visualized and the scope was retroflexed upon itself..  There was very mild trabeculation.  There were no neoplasms, stones, or diverticula identifed.  The ureteric orifices were normal in position and number and effluxing clear urine.  On withdrawal of the cystoscope the area of stricture was noted to be 1 to 2 cm in length extending inward from the meatus.    Assessment/Plan:   Feng Redding is a 35 year old male with a history of obstructive voiding symptoms now with evidence of a fossa navicularis stricture     -See clinic note      Rowdy Agee MD

## 2021-08-26 NOTE — NURSING NOTE
Feng Redding's goals for this visit include:   Chief Complaint   Patient presents with     Cystoscopy     urinary frequency, dysuria       He requests these members of his care team be copied on today's visit information:     [facility]    Referring Provider:  No referring provider defined for this encounter.    There were no vitals taken for this visit.    Do you need any medication refills at today's visit?     Stefany Araujo LPN on 8/26/2021 at 10:34 AM

## 2021-08-26 NOTE — PROGRESS NOTES
"[facility]   CHIEF COMPLAINT   It was my pleasure to see Feng Redding who is a 35 year old male for follow-up of urinary frequency .      HPI   Feng Redding is a very pleasant 35 year old male     Initially seen by Stefany Price 7/30/21:  \"Feng Redding is a 35 year old male with PMH significant for LESLIE and HTN seen today via virtual visit in consultation from Dr. Chopra for evaluation of urinary frequency. He describes a several year history of urinary frequency. Can go as often as every 30 minutes to a few hours at a time. He is typically up once at night to urinate. There is significant urgency as well as urge incontinence several times per week. In addition, he describes pain at the tip of the penis when urinating, significant slowing of his stream, taking a long time to void, and feeling of incomplete emptying. He describes feeling as though there is pressure from a fire hose trying to go through a tiny pinhole. This feeling is located in the distal urethra. About once per week, he also notices a pink discoloration at the end of his urine stream.     He was recently treated for a possible UTI with only minimal improvement in symptoms. UA demonstrated pyuria though UC returned with a low colony count of mixed  douglas. This was his first UTI. He denies concern for STIs (monogamous with his wife). No prior history of STIs. No history of kidney stones. No abdominal or back pain. He denies history of groin injury or trauma. No known family history of prostate issues.\"    TODAY 8/26/21:  Presents for cystoscopy  He notes that for years he is felt a restricted flow of his urine especially toward the tip of the penis    PHYSICAL EXAM  Patient is a 35 year old  male   Vitals: There were no vitals taken for this visit.  There is no height or weight on file to calculate BMI.  General Appearance Adult:   Alert, no acute distress, oriented  HENT: throat/mouth:normal, good dentition  Lungs: no respiratory " distress, or pursed lip breathing  Heart: No obvious jugular venous distension present  Abdomen: soft, nontender, no organomegaly or masses  Musculoskeltal: extremities normal, no peripheral edema  Skin: no suspicious lesions or rashes  Neuro: Alert, oriented, speech and mentation normal  Psych: affect and mood normal  Gait: Normal  : Circumcised phallus, pinpoint opening at the meatus, normal bilateral testes    UA RESULTS:  Recent Labs   Lab Test 07/07/21  0927   COLOR Yellow   APPEARANCE Clear   URINEGLC Negative   URINEBILI Negative   URINEKETONE Negative   SG 1.025   UBLD Negative   URINEPH 6.5   PROTEIN Negative   UROBILINOGEN 0.2   NITRITE Negative   LEUKEST Small*   RBCU O - 2   WBCU 10-25*        Creatinine   Date Value Ref Range Status   02/17/2021 1.04 0.66 - 1.25 mg/dL Final        ASSESSMENT and PLAN  35-year-old man with evidence of a fossa navicularis stricture    Fossa navicularis stricture  -Evidence of a pinpoint stricture on exam  -Sequential dilation with urethral sounds to 19 Pashto followed by flexible cystoscopy  -We had a long discussion regarding the urethral stricture and that the exact etiology is unknown, however likely secondary to inflammatory response.  Stricture itself was about 1 to 2 cm in length from the meatus inward.  The rest of the urethra and prostatic urethra and bladder neck were normal.  -We discussed that I anticipate he will be urinating much better following dilation, however this is likely only a temporary fix.  I will arrange for him to see my colleague Dr. Cortés for further evaluation and discussion in a few months  -We discussed that should his urinary symptoms worsen or he develop evidence of recurrence of the stricture before then to contact our office to facilitate an earlier evaluation.  -We discussed that long-term treatment would likely require a urethroplasty      Time spent: 20 minutes spent on the date of the encounter doing chart review, history and  exam, documentation and further activities as noted above.  This was in addition to cystoscopy time    Rowdy Agee MD   Urology  [facility]  [facility] Phone: [phone]  [facility] Phone: [phone]

## 2021-08-26 NOTE — PATIENT INSTRUCTIONS
"After Your Cystoscopy    What happens after the exam?    You may go back to your normal diet and activity as you feel ready, unless your doctor tells you not to.    For the next two days, you may notice:    Some blood in your urine  Some burning when you urinate (use the toilet)  An urge to urinate more often  Bladder spasms    These are normal after the procedure and should go away after a day or two.  To relieve these problems drink 6 to 8 large glasses of water each day (includes drinks at meals) as this will help clear the urine.  Take warm baths to relieve pain and bladder spasms.  Do not add anything to the bath water.  You may also take Tylenol (acetaminophen) for pain if needed.    When should I call my doctor?    A fever over 100F (38C) for more than a day. (Before you call the doctor, check your temperature under your tongue)  Chills  Failure to urinate: No urine comes out when you try to use the toilet. (Try soaking in a bathtub full of warm water. If still no urine, call your doctor)  A lot of blood in the urine, or blood clots larger than a nickel  Pain in the back or belly area (abdomen)  Pain or spasms that are not relieved by warm tub baths and pain medicine  Severe pain, burning or other problems while passing urine  Pain that gets worse after two days            AFTER YOUR CYSTOSCOPY        You have just completed a cystoscopy, or \"cysto\", which allowed your physician to learn more about your bladder (or to remove a stent placed after surgery). We suggest that you continue to avoid caffeine, fruit juice, and alcohol for the next 24 hours, however, you are encouraged to return to your normal activities.         A few things that are considered normal after your cystoscopy:     * Small amount of bleeding (or spotting) that clears within the next 24 hours     * Slight burning sensation with urination     * Sensation to of needing to avoid more frequently     * The feeling of \"air\" in your urine     * " Mild discomfort that is relieved with Tylenol        Please contact our office promptly if you:     * Develop a fever above 101 degrees     * Are unable to urinate     * Develop bright red blood that does not stop     * Severe pain or swelling         Please contact our office with any concerns or questions @[facility].

## 2021-08-28 ENCOUNTER — HEALTH MAINTENANCE LETTER (OUTPATIENT)
Age: 36
End: 2021-08-28

## 2021-08-31 ENCOUNTER — IMMUNIZATION (OUTPATIENT)
Dept: NURSING | Facility: CLINIC | Age: 36
End: 2021-08-31
Attending: INTERNAL MEDICINE
Payer: COMMERCIAL

## 2021-08-31 PROCEDURE — 91300 PR COVID VAC PFIZER DIL RECON 30 MCG/0.3 ML IM: CPT

## 2021-08-31 PROCEDURE — 0002A PR COVID VAC PFIZER DIL RECON 30 MCG/0.3 ML IM: CPT

## 2021-10-13 ENCOUNTER — PRE VISIT (OUTPATIENT)
Dept: UROLOGY | Facility: CLINIC | Age: 36
End: 2021-10-13
Payer: COMMERCIAL

## 2021-10-14 NOTE — TELEPHONE ENCOUNTER
MEDICAL RECORDS REQUEST   [facility]  Urology Clinic  [address] [place] [address]  PHONE: [phone]  Fax: [phone]        FUTURE VISIT INFORMATION                                                   Feng Redding, : 1985 scheduled for future visit at [facility]    APPOINTMENT INFORMATION:    Date: 2021    Provider:  Lino Cortés MD    Reason for Visit/Diagnosis: follow up imaging    REFERRAL INFORMATION:    Referring provider:  N/A    Specialty: N/A    Referring providers clinic:  N/A    Clinic contact number:  N/A    RECORDS REQUESTED FOR VISIT                                                     NOTES  STATUS/DETAILS   OFFICE NOTE from referring provider  no   OFFICE NOTE from other specialist  yes   DISCHARGE SUMMARY from hospital  no   DISCHARGE REPORT from the ER  no   OPERATIVE REPORT  no   MEDICATION LIST  no   LABS     URINALYSIS (UA)  yes   URINE CYTOLOGY  no   RUG (IMAGES & REPORT)  yes, 2021 -- internal   VCUG  (IMAGES & REPORT)  yes, 2021 -- internal      PRE-VISIT CHECKLIST      Record collection complete yes   Appointment appropriately scheduled           (right time/right provider) Yes   Joint diagnostic appointment coordinated correctly          (ensure right order & amount of time) Yes   MyChart activation Yes   Questionnaire complete If no, please explain pending

## 2021-10-23 ENCOUNTER — HEALTH MAINTENANCE LETTER (OUTPATIENT)
Age: 36
End: 2021-10-23

## 2021-11-08 ENCOUNTER — PRE VISIT (OUTPATIENT)
Dept: UROLOGY | Facility: CLINIC | Age: 36
End: 2021-11-08
Payer: COMMERCIAL

## 2021-11-08 NOTE — TELEPHONE ENCOUNTER
Reason for visit: Follow up     Relevant information: review imaging    Records/imaging/labs/orders: in EPIC    Pt called: no    At Rooming: normal

## 2021-11-30 ENCOUNTER — ANCILLARY PROCEDURE (OUTPATIENT)
Dept: GENERAL RADIOLOGY | Facility: CLINIC | Age: 36
End: 2021-11-30
Attending: UROLOGY
Payer: COMMERCIAL

## 2021-11-30 DIAGNOSIS — R39.198 SLOW URINARY STREAM: ICD-10-CM

## 2021-11-30 DIAGNOSIS — N35.914 STRICTURE OF ANTERIOR URETHRA IN MALE, UNSPECIFIED STRICTURE TYPE: ICD-10-CM

## 2021-11-30 PROCEDURE — 74455 X-RAY URETHRA/BLADDER: CPT | Mod: GC | Performed by: RADIOLOGY

## 2021-11-30 PROCEDURE — 51610 INJECTION FOR BLADDER X-RAY: CPT | Mod: GC | Performed by: RADIOLOGY

## 2021-11-30 RX ORDER — LIDOCAINE HYDROCHLORIDE 20 MG/ML
10 JELLY TOPICAL ONCE
Status: COMPLETED | OUTPATIENT
Start: 2021-11-30 | End: 2021-11-30

## 2021-11-30 RX ADMIN — LIDOCAINE HYDROCHLORIDE 10 ML: 20 JELLY TOPICAL at 09:29

## 2021-12-01 ENCOUNTER — OFFICE VISIT (OUTPATIENT)
Dept: UROLOGY | Facility: CLINIC | Age: 36
End: 2021-12-01
Payer: COMMERCIAL

## 2021-12-01 ENCOUNTER — TELEPHONE (OUTPATIENT)
Dept: UROLOGY | Facility: CLINIC | Age: 36
End: 2021-12-01

## 2021-12-01 VITALS
SYSTOLIC BLOOD PRESSURE: 158 MMHG | BODY MASS INDEX: 28.63 KG/M2 | HEIGHT: 70 IN | DIASTOLIC BLOOD PRESSURE: 106 MMHG | HEART RATE: 83 BPM | WEIGHT: 200 LBS

## 2021-12-01 DIAGNOSIS — R39.198 SLOW URINARY STREAM: ICD-10-CM

## 2021-12-01 DIAGNOSIS — N35.914 STRICTURE OF ANTERIOR URETHRA IN MALE, UNSPECIFIED STRICTURE TYPE: ICD-10-CM

## 2021-12-01 DIAGNOSIS — N99.114 POSTPROCEDURAL MALE URETHRAL STRICTURE: Primary | ICD-10-CM

## 2021-12-01 DIAGNOSIS — R31.0 GROSS HEMATURIA: ICD-10-CM

## 2021-12-01 DIAGNOSIS — N39.0 ACUTE UTI: ICD-10-CM

## 2021-12-01 DIAGNOSIS — Z01.812 PRE-PROCEDURE LAB EXAM: ICD-10-CM

## 2021-12-01 DIAGNOSIS — R39.15 URINARY URGENCY: ICD-10-CM

## 2021-12-01 DIAGNOSIS — Z11.59 ENCOUNTER FOR SCREENING FOR OTHER VIRAL DISEASES: ICD-10-CM

## 2021-12-01 LAB
ALBUMIN UR-MCNC: NEGATIVE MG/DL
APPEARANCE UR: CLEAR
BILIRUB UR QL STRIP: NEGATIVE
COLOR UR AUTO: YELLOW
GLUCOSE UR STRIP-MCNC: NEGATIVE MG/DL
HGB UR QL STRIP: NEGATIVE
KETONES UR STRIP-MCNC: NEGATIVE MG/DL
LEUKOCYTE ESTERASE UR QL STRIP: NEGATIVE
MUCOUS THREADS #/AREA URNS LPF: PRESENT /LPF
NITRATE UR QL: NEGATIVE
PH UR STRIP: 6 [PH] (ref 5–7)
RBC URINE: <1 /HPF
SP GR UR STRIP: 1.01 (ref 1–1.03)
UROBILINOGEN UR STRIP-MCNC: NORMAL MG/DL
WBC URINE: 0 /HPF

## 2021-12-01 PROCEDURE — 99000 SPECIMEN HANDLING OFFICE-LAB: CPT | Performed by: PATHOLOGY

## 2021-12-01 PROCEDURE — 87086 URINE CULTURE/COLONY COUNT: CPT | Mod: 90 | Performed by: PATHOLOGY

## 2021-12-01 PROCEDURE — 99207 PR NO CHARGE NURSE ONLY: CPT

## 2021-12-01 PROCEDURE — 81001 URINALYSIS AUTO W/SCOPE: CPT | Performed by: PATHOLOGY

## 2021-12-01 PROCEDURE — 99214 OFFICE O/P EST MOD 30 MIN: CPT | Performed by: UROLOGY

## 2021-12-01 RX ORDER — CEFAZOLIN SODIUM 2 G/50ML
2 SOLUTION INTRAVENOUS SEE ADMIN INSTRUCTIONS
Status: CANCELLED | OUTPATIENT
Start: 2021-12-01

## 2021-12-01 RX ORDER — CEFAZOLIN SODIUM 2 G/50ML
2 SOLUTION INTRAVENOUS
Status: CANCELLED | OUTPATIENT
Start: 2021-12-01

## 2021-12-01 ASSESSMENT — MIFFLIN-ST. JEOR: SCORE: 1838.44

## 2021-12-01 ASSESSMENT — PAIN SCALES - GENERAL: PAINLEVEL: NO PAIN (0)

## 2021-12-01 NOTE — NURSING NOTE
"Chief Complaint   Patient presents with     Consult     urethral stricture       Height 1.778 m (5' 10\"), weight 90.7 kg (200 lb). Body mass index is 28.7 kg/m .    Patient Active Problem List   Diagnosis     Overweight (BMI 25.0-29.9)     LESLIE (obstructive sleep apnea)     Obesity (BMI 30.0-34.9)     Elevated BP without diagnosis of hypertension     Nocturia     Acute UTI       Allergies   Allergen Reactions     Apples [Apple]      Cherry      Peaches [Prunus Persica]      Pear        No current outpatient medications on file.       Social History     Tobacco Use     Smoking status: Never Smoker     Smokeless tobacco: Former User     Types: Chew     Tobacco comment: Quit oral tobacco March 2021   Substance Use Topics     Alcohol use: Yes     Drug use: Never       Gerber Hicks EMT  12/1/2021  11:02 AM  "

## 2021-12-01 NOTE — PROGRESS NOTES
"HPI:  Feng Redding is a 35 year old male being seen for fossa navicularis stricture.    On 8/26/2021, he had a cysto with serial dilation.  He noted it helped dramatically.  He felt excellent for a few weeks.  Now he's back to obstructive voiding.  He feels like there's a big balloon and hes urinating through a pinhole.  He's happily .  No erectile issues.  He has LESLIE.   He has urinary urgency.  Has incomplete emptying.    Exam:  BP (!) 158/106   Pulse 83   Ht 1.778 m (5' 10\")   Wt 90.7 kg (200 lb)   BMI 28.70 kg/m    NAD  Abdomen soft  Some meatal stenosis visible.     Review of Imaging:  The following imaging exams were independently viewed and interpreted by me and discussed with patient:    I reviewed his RUG/VCUG. It shows a short segment fossa stricture.        Review of Labs:  The following labs were reviewed by me and discussed with the patient:  UCX with negative.  No RBC      Assessment & Plan     Urethral stricture at fossa and meatus    Discussed nikilovsky style ventral buccal inlay urethroplasty. Discussed various other approaches.  Discussed 1 week summers  Discussed risks, benefits, perioperative course. Discussed risks of re-stenosis and spraying postop.  Wants to proceed. Orders placed.      Lino Cortés MD  Reconstructive Urology  [facility]        30 minutes spent on the date of the encounter doing chart review, history and exam, documentation and further activities per the note      "

## 2021-12-01 NOTE — CONFIDENTIAL NOTE
Patient is scheduled for surgery with Dr. Cortés    Spoke with: Irma PUENTE in clinic face to face 12/01/21    Date of Surgery: Monday 12/13/21    Location: [facility] OR     Informed patient they will need an adult  Yes    Pre op with Provider faviola    H&P: Scheduled with Patient will schedule with PCP    [facility]-19 [facility]     Additional imaging/appointments: na    Surgery packet: Irma PUENTE gave to patient in clinic face 12/01/21     Additional comments: na

## 2021-12-01 NOTE — NURSING NOTE
Pre Op Teaching Flowsheet                                        Pre and Post op Patient Education  Relevant Diagnosis: Urethral stricture  Teaching Topic:  Pre and post op teaching for Urethroplasty with Mucosa buccal graft  Person Involved in teaching:  Patient      Motivation Level: High  Asks Questions: Yes  Eager to Learn:  Yes  Cooperative: Yes  Receptive (willing/able to accept information):  Yes  Patient demonstrates understanding of the following:  Date and time of surgery:  12/13/21  Location of [facility]   History and Physical and any other testing necessary prior to surgery Pre Op Physical with: PCP on 12/09/21  Required time line for completion of History and Physical and any pre-op testing: No more than 30 days prior to surgery date    NPO Guidelines: NPO per Anesthesia Guidelines : Reviewed (surgery packet for further information).    Patient demonstrates understanding of the following:  Patient understands the need for a responsible adult to drive them home and someone to stay with them for the first 24 hours post-operatively: Wife  Pre-op bowel prep: N/A  Pre-op showering/scrub information with Hibiclens Soap: Yes  Medications to take the day of surgery: Pre op Physical for instruction. given  Blood thinner medications discussed and when to stop (if applicable):  NA  Diabetes medication management (if applicable):  Patient to discuss with Primary Care Provider NA  Discussed pain control after surgery: pain scale, pain medications and pain management techniques  Infection Prevention: Patient demonstrates understanding of the following:  Patient instructed on hand hygiene:  Yes  Surgical procedure site care taught: Yes  Signs and symptoms of infection taught:  Yes  Wound care will be taught at the time of discharge.    Urine anylisis and Urine Culture ordered on:12/01/21  Pre op Covid testing on: 12/09/21  Post-op follow-up:12/22/21  Discussed how to contact the hospital, nurse, and clinic  scheduling staff if necessary.     Surgical instructions given to patient in clinic: yes   Instructional materials used/given/mailed: Before your surgery packet , Medications to avoid before surgery , Showering or Bathing instructions before surgery  and What to expect after surgery    Total time with patient: 10 minutes   Irma Hein RN

## 2021-12-02 LAB — BACTERIA UR CULT: NO GROWTH

## 2021-12-09 ENCOUNTER — LAB (OUTPATIENT)
Dept: LAB | Facility: CLINIC | Age: 36
End: 2021-12-09
Attending: UROLOGY
Payer: COMMERCIAL

## 2021-12-09 ENCOUNTER — OFFICE VISIT (OUTPATIENT)
Dept: FAMILY MEDICINE | Facility: CLINIC | Age: 36
End: 2021-12-09
Payer: COMMERCIAL

## 2021-12-09 VITALS
HEIGHT: 68 IN | RESPIRATION RATE: 20 BRPM | SYSTOLIC BLOOD PRESSURE: 139 MMHG | DIASTOLIC BLOOD PRESSURE: 99 MMHG | BODY MASS INDEX: 31.13 KG/M2 | HEART RATE: 77 BPM | OXYGEN SATURATION: 96 % | WEIGHT: 205.4 LBS | TEMPERATURE: 97.6 F

## 2021-12-09 DIAGNOSIS — N35.919 STRICTURE OF MALE URETHRA, UNSPECIFIED STRICTURE TYPE: ICD-10-CM

## 2021-12-09 DIAGNOSIS — Z11.59 ENCOUNTER FOR SCREENING FOR OTHER VIRAL DISEASES: ICD-10-CM

## 2021-12-09 DIAGNOSIS — Z01.818 PREOP GENERAL PHYSICAL EXAM: Primary | ICD-10-CM

## 2021-12-09 DIAGNOSIS — I10 BENIGN ESSENTIAL HYPERTENSION: ICD-10-CM

## 2021-12-09 LAB
ANION GAP SERPL CALCULATED.3IONS-SCNC: 4 MMOL/L (ref 3–14)
BUN SERPL-MCNC: 15 MG/DL (ref 7–30)
CALCIUM SERPL-MCNC: 9.1 MG/DL (ref 8.5–10.1)
CHLORIDE BLD-SCNC: 106 MMOL/L (ref 94–109)
CO2 SERPL-SCNC: 28 MMOL/L (ref 20–32)
CREAT SERPL-MCNC: 0.84 MG/DL (ref 0.66–1.25)
GFR SERPL CREATININE-BSD FRML MDRD: >90 ML/MIN/1.73M2
GLUCOSE BLD-MCNC: 94 MG/DL (ref 70–99)
POTASSIUM BLD-SCNC: 4.3 MMOL/L (ref 3.4–5.3)
SODIUM SERPL-SCNC: 138 MMOL/L (ref 133–144)

## 2021-12-09 PROCEDURE — U0005 INFEC AGEN DETEC AMPLI PROBE: HCPCS

## 2021-12-09 PROCEDURE — U0003 INFECTIOUS AGENT DETECTION BY NUCLEIC ACID (DNA OR RNA); SEVERE ACUTE RESPIRATORY SYNDROME CORONAVIRUS 2 (SARS-COV-2) (CORONAVIRUS DISEASE [COVID-19]), AMPLIFIED PROBE TECHNIQUE, MAKING USE OF HIGH THROUGHPUT TECHNOLOGIES AS DESCRIBED BY CMS-2020-01-R: HCPCS

## 2021-12-09 PROCEDURE — 99214 OFFICE O/P EST MOD 30 MIN: CPT | Performed by: PHYSICIAN ASSISTANT

## 2021-12-09 PROCEDURE — 36415 COLL VENOUS BLD VENIPUNCTURE: CPT | Performed by: PHYSICIAN ASSISTANT

## 2021-12-09 PROCEDURE — 93000 ELECTROCARDIOGRAM COMPLETE: CPT | Performed by: PHYSICIAN ASSISTANT

## 2021-12-09 PROCEDURE — 80048 BASIC METABOLIC PNL TOTAL CA: CPT | Performed by: PHYSICIAN ASSISTANT

## 2021-12-09 RX ORDER — LOSARTAN POTASSIUM 25 MG/1
25 TABLET ORAL DAILY
Qty: 60 TABLET | Refills: 0 | Status: SHIPPED | OUTPATIENT
Start: 2021-12-09 | End: 2022-02-07

## 2021-12-09 ASSESSMENT — MIFFLIN-ST. JEOR: SCORE: 1835.16

## 2021-12-09 ASSESSMENT — PAIN SCALES - GENERAL: PAINLEVEL: NO PAIN (0)

## 2021-12-09 NOTE — PROGRESS NOTES
[facility] [place]  [address]  [place] [address]  Phone: [phone]  Primary Provider: [facility], [facility]  Pre-op Performing Provider: JACKIE STEEL      PREOPERATIVE EVALUATION:  Today's date: 12/9/2021    Feng Redding is a 35 year old male who presents for a preoperative evaluation.    Surgical Information:  Surgery/Procedure: URETHROPLASTY, USING BUCCAL MUCOSA GRAFT  Surgery Location: [facility]  Surgeon: Dr. Cortés  Surgery Date: 12/13/21  Time of Surgery: AM  Where patient plans to recover: At home with family  Fax number for surgical facility: Note does not need to be faxed, will be available electronically in Epic.    Type of Anesthesia Anticipated: to be determined    Feng was seen today for pre-op exam.    Diagnoses and all orders for this visit:    Preop general physical exam    Benign essential hypertension  -     Basic metabolic panel  (Ca, Cl, CO2, Creat, Gluc, K, Na, BUN); Future  -     losartan (COZAAR) 25 MG tablet; Take 1 tablet (25 mg) by mouth daily  -     EKG 12-lead complete w/read - Clinics  -     Basic metabolic panel  (Ca, Cl, CO2, Creat, Gluc, K, Na, BUN)    Stricture of male urethra, unspecified stricture type    Other orders  -     REVIEW OF HEALTH MAINTENANCE PROTOCOL ORDERS      Cheko is cleared for surgery and anesthesia.    Subjective     HPI related to upcoming procedure: URETHROPLASTY, USING BUCCAL MUCOSA GRAFT    Preop Questions 12/9/2021   1. Have you ever had a heart attack or stroke? No   2. Have you ever had surgery on your heart or blood vessels, such as a stent placement, a coronary artery bypass, or surgery on an artery in your head, neck, heart, or legs? No   3. Do you have chest pain with activity? No   4. Do you have a history of  heart failure? No   5. Do you currently have a cold, bronchitis or symptoms of other infection? No   6. Do you have a cough, shortness of breath, or wheezing? No   7. Do you or anyone in your family  have previous history of blood clots? No   8. Do you or does anyone in your family have a serious bleeding problem such as prolonged bleeding following surgeries or cuts? No   9. Have you ever had problems with anemia or been told to take iron pills? No   10. Have you had any abnormal blood loss such as black, tarry or bloody stools? No   11. Have you ever had a blood transfusion? No   12. Are you willing to have a blood transfusion if it is medically needed before, during, or after your surgery? Yes   13. Have you or any of your relatives ever had problems with anesthesia? No   14. Do you have sleep apnea, excessive snoring or daytime drowsiness? YES - ajit   14a. Do you have a CPAP machine? Yes   15. Do you have any artifical heart valves or other implanted medical devices like a pacemaker, defibrillator, or continuous glucose monitor? No   16. Do you have artificial joints? No   17. Are you allergic to latex? No       Health Care Directive:  Patient does not have a Health Care Directive or Living Will: Discussed advance care planning with patient; however, patient declined at this time.    Preoperative Review of :   reviewed - no record of controlled substances prescribed.      Status of Chronic Conditions:  See problem list for active medical problems.  Problems all longstanding and stable, except as noted/documented.  See ROS for pertinent symptoms related to these conditions.      Review of Systems  CONSTITUTIONAL: NEGATIVE for fever, chills, change in weight  INTEGUMENTARY/SKIN: NEGATIVE for worrisome rashes, moles or lesions  EYES: NEGATIVE for vision changes or irritation  ENT/MOUTH: NEGATIVE for ear, mouth and throat problems  RESP: NEGATIVE for significant cough or SOB  CV: NEGATIVE for chest pain, palpitations or peripheral edema  GI: NEGATIVE for nausea, abdominal pain, heartburn, or change in bowel habits  : NEGATIVE for frequency, dysuria, or hematuria  MUSCULOSKELETAL: NEGATIVE for significant  arthralgias or myalgia  NEURO: NEGATIVE for weakness, dizziness or paresthesias  ENDOCRINE: NEGATIVE for temperature intolerance, skin/hair changes  HEME: NEGATIVE for bleeding problems  PSYCHIATRIC: NEGATIVE for changes in mood or affect    Patient Active Problem List    Diagnosis Date Noted     Postprocedural male urethral stricture 12/01/2021     Priority: Medium     Added automatically from request for surgery [id number]       Obesity (BMI 30.0-34.9) 07/07/2021     Priority: Medium     Elevated BP without diagnosis of hypertension 07/07/2021     Priority: Medium     Nocturia 07/07/2021     Priority: Medium     Acute UTI 07/07/2021     Priority: Medium     LESLIE (obstructive sleep apnea) 04/16/2021     Priority: Medium     HSAT 4/14/2021  Wt 190 lbs BMI 27.36  AHI 17.3 Lowest O2 sat 83%       Overweight (BMI 25.0-29.9) 03/17/2021     Priority: Medium      Past Medical History:   Diagnosis Date     LESLIE (obstructive sleep apnea) 2021     No past surgical history on file.  No current outpatient medications on file.       Allergies   Allergen Reactions     Apples [Apple]      Cherry      Peaches [Prunus Persica]      Pear         Social History     Tobacco Use     Smoking status: Never Smoker     Smokeless tobacco: Former User     Types: Chew     Tobacco comment: Quit oral tobacco March 2021   Substance Use Topics     Alcohol use: Yes     Family History   Problem Relation Age of Onset     Heart Disease Father      Allergies Father      Sleep Apnea Father      Substance Abuse Maternal Grandfather      Heart Disease Paternal Grandmother      Hypertension Paternal Grandmother      Heart Disease Paternal Grandfather      Hypertension Paternal Grandfather      Allergies Brother      Sleep Apnea Mother      History   Drug Use Unknown         Objective     There were no vitals taken for this visit.    Physical Exam    GENERAL APPEARANCE: healthy, alert and no distress     EYES: EOMI,  PERRL     HENT: ear canals and TM's normal  and nose and mouth without ulcers or lesions     NECK: no adenopathy, no asymmetry, masses, or scars and thyroid normal to palpation     RESP: lungs clear to auscultation - no rales, rhonchi or wheezes     CV: regular rates and rhythm, normal S1 S2, no S3 or S4 and no murmur, click or rub     ABDOMEN:  soft, nontender, no HSM or masses and bowel sounds normal     MS: extremities normal- no gross deformities noted, no evidence of inflammation in joints, FROM in all extremities.     SKIN: no suspicious lesions or rashes     NEURO: Normal strength and tone, sensory exam grossly normal, mentation intact and speech normal     PSYCH: mentation appears normal. and affect normal/bright     LYMPHATICS: No cervical adenopathy    Recent Labs   Lab Test 02/17/21  0900      POTASSIUM 4.4   CR 1.04   A1C 5.2        Diagnostics:  Labs pending at this time.  Results will be reviewed when available.   EKG: appears normal, NSR, normal axis, normal intervals, no acute ST/T changes c/w ischemia, no LVH by voltage criteria, unchanged from previous tracings    Revised Cardiac Risk Index (RCRI):  The patient has the following serious cardiovascular risks for perioperative complications:   - No serious cardiac risks = 0 points     RCRI Interpretation: 0 points: Class I (very low risk - 0.4% complication rate)           Signed Electronically by: Justyn Ross PA-C  Copy of this evaluation report is provided to requesting physician.

## 2021-12-10 LAB — SARS-COV-2 RNA RESP QL NAA+PROBE: NEGATIVE

## 2021-12-10 RX ORDER — HYDROMORPHONE HYDROCHLORIDE 1 MG/ML
0.4 INJECTION, SOLUTION INTRAMUSCULAR; INTRAVENOUS; SUBCUTANEOUS EVERY 5 MIN PRN
Status: CANCELLED | OUTPATIENT
Start: 2021-12-10

## 2021-12-10 RX ORDER — OXYCODONE HYDROCHLORIDE 5 MG/1
5 TABLET ORAL EVERY 4 HOURS PRN
Status: CANCELLED | OUTPATIENT
Start: 2021-12-10

## 2021-12-10 RX ORDER — SODIUM CHLORIDE, SODIUM LACTATE, POTASSIUM CHLORIDE, CALCIUM CHLORIDE 600; 310; 30; 20 MG/100ML; MG/100ML; MG/100ML; MG/100ML
INJECTION, SOLUTION INTRAVENOUS CONTINUOUS
Status: CANCELLED | OUTPATIENT
Start: 2021-12-10

## 2021-12-10 RX ORDER — GABAPENTIN 300 MG/1
300 CAPSULE ORAL
Status: CANCELLED | OUTPATIENT
Start: 2021-12-10

## 2021-12-10 RX ORDER — FENTANYL CITRATE 50 UG/ML
50 INJECTION, SOLUTION INTRAMUSCULAR; INTRAVENOUS EVERY 5 MIN PRN
Status: CANCELLED | OUTPATIENT
Start: 2021-12-10

## 2021-12-10 RX ORDER — LABETALOL HYDROCHLORIDE 5 MG/ML
10 INJECTION, SOLUTION INTRAVENOUS
Status: CANCELLED | OUTPATIENT
Start: 2021-12-10

## 2021-12-10 RX ORDER — ONDANSETRON 4 MG/1
4 TABLET, ORALLY DISINTEGRATING ORAL EVERY 30 MIN PRN
Status: CANCELLED | OUTPATIENT
Start: 2021-12-10

## 2021-12-10 RX ORDER — LIDOCAINE 40 MG/G
CREAM TOPICAL
Status: CANCELLED | OUTPATIENT
Start: 2021-12-10

## 2021-12-10 RX ORDER — MEPERIDINE HYDROCHLORIDE 25 MG/ML
12.5 INJECTION INTRAMUSCULAR; INTRAVENOUS; SUBCUTANEOUS
Status: CANCELLED | OUTPATIENT
Start: 2021-12-10

## 2021-12-10 RX ORDER — ACETAMINOPHEN 325 MG/1
975 TABLET ORAL ONCE
Status: CANCELLED | OUTPATIENT
Start: 2021-12-10 | End: 2021-12-10

## 2021-12-10 RX ORDER — ONDANSETRON 2 MG/ML
4 INJECTION INTRAMUSCULAR; INTRAVENOUS EVERY 30 MIN PRN
Status: CANCELLED | OUTPATIENT
Start: 2021-12-10

## 2021-12-10 RX ORDER — FENTANYL CITRATE 50 UG/ML
50 INJECTION, SOLUTION INTRAMUSCULAR; INTRAVENOUS
Status: CANCELLED | OUTPATIENT
Start: 2021-12-10

## 2021-12-13 ENCOUNTER — HOSPITAL ENCOUNTER (OUTPATIENT)
Facility: AMBULATORY SURGERY CENTER | Age: 36
Discharge: HOME | End: 2021-12-13
Attending: UROLOGY
Payer: COMMERCIAL

## 2021-12-13 ENCOUNTER — TELEPHONE (OUTPATIENT)
Dept: UROLOGY | Facility: CLINIC | Age: 36
End: 2021-12-13

## 2021-12-13 VITALS
OXYGEN SATURATION: 96 % | HEIGHT: 70 IN | SYSTOLIC BLOOD PRESSURE: 121 MMHG | BODY MASS INDEX: 28.63 KG/M2 | HEART RATE: 74 BPM | WEIGHT: 200 LBS | TEMPERATURE: 97.3 F | DIASTOLIC BLOOD PRESSURE: 89 MMHG | RESPIRATION RATE: 18 BRPM

## 2021-12-13 DIAGNOSIS — N99.114 POSTPROCEDURAL MALE URETHRAL STRICTURE: ICD-10-CM

## 2021-12-13 DIAGNOSIS — Z11.59 ENCOUNTER FOR SCREENING FOR OTHER VIRAL DISEASES: ICD-10-CM

## 2021-12-13 ASSESSMENT — MIFFLIN-ST. JEOR: SCORE: 1838.44

## 2021-12-13 NOTE — CONFIDENTIAL NOTE
Patient is scheduled for surgery with Dr. Cortés    Spoke with: Patient     Date of Surgery: Friday 01/28/22    Location: [facility]    Informed patient they will need an adult  Yes    Pre op with Provider faviola    H&P: Scheduled with Patient had pre-op with his PCP 12/09/21. Dr. Cortés will update pre-op DOS.     Pre-procedure COVID-19 Test: Monday 01/24/22 [facility]     Additional imaging/appointments: na    Surgery packet: patient declined a new surgery packet be mailed out.      Additional comments: patient wants to be on the wait list for a sooner surgery date if it becomes available.

## 2021-12-13 NOTE — CONFIDENTIAL NOTE
Called patient to reschedule procedure with Dr. Cortés, there was no answer.  Left message with my direct line [phone]. Ayesha FERNÁNDEZ Ny-Op Coordinator for Urology Surgery

## 2021-12-13 NOTE — OR NURSING
Surgery cancelled by Dr. Ramirez anesthesia and Dr. Cortés on 12/13/21 due to a fall with head trauma over the weekend.

## 2022-01-10 ENCOUNTER — PRE VISIT (OUTPATIENT)
Dept: UROLOGY | Facility: CLINIC | Age: 37
End: 2022-01-10
Payer: COMMERCIAL

## 2022-01-10 NOTE — TELEPHONE ENCOUNTER
Reason for visit: Post op follow up     Relevant information: s/p urethroplasty    Records/imaging/labs/orders: in EPIC    Pt called: no    At Rooming: [employer] supplies

## 2022-01-18 ENCOUNTER — TELEPHONE (OUTPATIENT)
Dept: UROLOGY | Facility: CLINIC | Age: 37
End: 2022-01-18
Payer: COMMERCIAL

## 2022-01-18 DIAGNOSIS — N35.819 OTHER STRICTURE OF URETHRA IN MALE: Primary | ICD-10-CM

## 2022-01-18 NOTE — CONFIDENTIAL NOTE
Spoke to patient and verified surgery is scheduled for Friday 01/28/22 but that we are just waiting on a new case request and that is why it is not showing up on [employer] as of yet. Patient is also aware that no new pre-op is needed as Dr. Cortés will be updating the pre-op DOS. Patient was also instructed that the can use antibacterial soap to do his pre-surgery showers.

## 2022-01-24 ENCOUNTER — LAB (OUTPATIENT)
Dept: LAB | Facility: CLINIC | Age: 37
End: 2022-01-24
Payer: COMMERCIAL

## 2022-01-24 DIAGNOSIS — Z11.59 ENCOUNTER FOR SCREENING FOR OTHER VIRAL DISEASES: ICD-10-CM

## 2022-01-24 PROCEDURE — U0003 INFECTIOUS AGENT DETECTION BY NUCLEIC ACID (DNA OR RNA); SEVERE ACUTE RESPIRATORY SYNDROME CORONAVIRUS 2 (SARS-COV-2) (CORONAVIRUS DISEASE [COVID-19]), AMPLIFIED PROBE TECHNIQUE, MAKING USE OF HIGH THROUGHPUT TECHNOLOGIES AS DESCRIBED BY CMS-2020-01-R: HCPCS

## 2022-01-24 PROCEDURE — U0005 INFEC AGEN DETEC AMPLI PROBE: HCPCS

## 2022-01-25 PROBLEM — N99.114 POSTPROCEDURAL MALE URETHRAL STRICTURE: Status: ACTIVE | Noted: 2021-12-01

## 2022-01-25 LAB — SARS-COV-2 RNA RESP QL NAA+PROBE: NEGATIVE

## 2022-01-27 ENCOUNTER — ANESTHESIA EVENT (OUTPATIENT)
Dept: SURGERY | Facility: AMBULATORY SURGERY CENTER | Age: 37
End: 2022-01-27
Payer: COMMERCIAL

## 2022-01-28 ENCOUNTER — NURSE TRIAGE (OUTPATIENT)
Dept: NURSING | Facility: CLINIC | Age: 37
End: 2022-01-28

## 2022-01-28 ENCOUNTER — HOSPITAL ENCOUNTER (OUTPATIENT)
Facility: AMBULATORY SURGERY CENTER | Age: 37
End: 2022-01-28
Attending: UROLOGY
Payer: COMMERCIAL

## 2022-01-28 ENCOUNTER — ANESTHESIA (OUTPATIENT)
Dept: SURGERY | Facility: AMBULATORY SURGERY CENTER | Age: 37
End: 2022-01-28
Payer: COMMERCIAL

## 2022-01-28 ENCOUNTER — APPOINTMENT (OUTPATIENT)
Dept: UROLOGY | Facility: CLINIC | Age: 37
End: 2022-01-28
Payer: COMMERCIAL

## 2022-01-28 VITALS
RESPIRATION RATE: 18 BRPM | OXYGEN SATURATION: 94 % | DIASTOLIC BLOOD PRESSURE: 94 MMHG | BODY MASS INDEX: 28.63 KG/M2 | HEIGHT: 70 IN | HEART RATE: 86 BPM | WEIGHT: 200 LBS | SYSTOLIC BLOOD PRESSURE: 126 MMHG | TEMPERATURE: 98.6 F

## 2022-01-28 DIAGNOSIS — N99.114 POSTPROCEDURAL MALE URETHRAL STRICTURE: Primary | ICD-10-CM

## 2022-01-28 PROCEDURE — 53410 RECONSTRUCTION OF URETHRA: CPT | Mod: GC | Performed by: UROLOGY

## 2022-01-28 PROCEDURE — 53410 RECONSTRUCTION OF URETHRA: CPT

## 2022-01-28 PROCEDURE — 15240 FTH/GFT F/C/C/M/N/AX/G/H/F20: CPT

## 2022-01-28 PROCEDURE — 88305 TISSUE EXAM BY PATHOLOGIST: CPT | Mod: TC | Performed by: UROLOGY

## 2022-01-28 PROCEDURE — 15240 FTH/GFT F/C/C/M/N/AX/G/H/F20: CPT | Mod: GC | Performed by: UROLOGY

## 2022-01-28 RX ORDER — ONDANSETRON 2 MG/ML
4 INJECTION INTRAMUSCULAR; INTRAVENOUS EVERY 30 MIN PRN
Status: DISCONTINUED | OUTPATIENT
Start: 2022-01-28 | End: 2022-01-29 | Stop reason: HOSPADM

## 2022-01-28 RX ORDER — CHLORHEXIDINE GLUCONATE ORAL RINSE 1.2 MG/ML
15 SOLUTION DENTAL 2 TIMES DAILY
Qty: 210 ML | Refills: 0 | Status: SHIPPED | OUTPATIENT
Start: 2022-01-28 | End: 2022-02-04

## 2022-01-28 RX ORDER — PROPOFOL 10 MG/ML
INJECTION, EMULSION INTRAVENOUS CONTINUOUS PRN
Status: DISCONTINUED | OUTPATIENT
Start: 2022-01-28 | End: 2022-01-28

## 2022-01-28 RX ORDER — ONDANSETRON 2 MG/ML
INJECTION INTRAMUSCULAR; INTRAVENOUS PRN
Status: DISCONTINUED | OUTPATIENT
Start: 2022-01-28 | End: 2022-01-28

## 2022-01-28 RX ORDER — PROPOFOL 10 MG/ML
INJECTION, EMULSION INTRAVENOUS PRN
Status: DISCONTINUED | OUTPATIENT
Start: 2022-01-28 | End: 2022-01-28

## 2022-01-28 RX ORDER — BUPIVACAINE HYDROCHLORIDE AND EPINEPHRINE 5; 5 MG/ML; UG/ML
INJECTION, SOLUTION PERINEURAL PRN
Status: DISCONTINUED | OUTPATIENT
Start: 2022-01-28 | End: 2022-01-28 | Stop reason: HOSPADM

## 2022-01-28 RX ORDER — CIPROFLOXACIN 500 MG/1
500 TABLET, FILM COATED ORAL 2 TIMES DAILY
Qty: 1 TABLET | Refills: 0 | Status: SHIPPED | OUTPATIENT
Start: 2022-01-28

## 2022-01-28 RX ORDER — CEFAZOLIN SODIUM 2 G/50ML
2 SOLUTION INTRAVENOUS
Status: COMPLETED | OUTPATIENT
Start: 2022-01-28 | End: 2022-01-28

## 2022-01-28 RX ORDER — LEVETIRACETAM 1000 MG/1
TABLET ORAL
COMMUNITY
Start: 2021-12-12 | End: 2022-01-28

## 2022-01-28 RX ORDER — FENTANYL CITRATE 50 UG/ML
25 INJECTION, SOLUTION INTRAMUSCULAR; INTRAVENOUS EVERY 5 MIN PRN
Status: DISCONTINUED | OUTPATIENT
Start: 2022-01-28 | End: 2022-01-28 | Stop reason: HOSPADM

## 2022-01-28 RX ORDER — HYDROMORPHONE HYDROCHLORIDE 1 MG/ML
0.2 INJECTION, SOLUTION INTRAMUSCULAR; INTRAVENOUS; SUBCUTANEOUS EVERY 5 MIN PRN
Status: DISCONTINUED | OUTPATIENT
Start: 2022-01-28 | End: 2022-01-28 | Stop reason: HOSPADM

## 2022-01-28 RX ORDER — TOLTERODINE 4 MG/1
4 CAPSULE, EXTENDED RELEASE ORAL DAILY
Qty: 6 CAPSULE | Refills: 0 | Status: SHIPPED | OUTPATIENT
Start: 2022-01-28

## 2022-01-28 RX ORDER — FENTANYL CITRATE 50 UG/ML
25 INJECTION, SOLUTION INTRAMUSCULAR; INTRAVENOUS
Status: DISCONTINUED | OUTPATIENT
Start: 2022-01-28 | End: 2022-01-29 | Stop reason: HOSPADM

## 2022-01-28 RX ORDER — OXYCODONE HYDROCHLORIDE 5 MG/1
5-10 TABLET ORAL EVERY 4 HOURS PRN
Qty: 10 TABLET | Refills: 0 | Status: SHIPPED | OUTPATIENT
Start: 2022-01-28

## 2022-01-28 RX ORDER — DEXAMETHASONE SODIUM PHOSPHATE 4 MG/ML
INJECTION, SOLUTION INTRA-ARTICULAR; INTRALESIONAL; INTRAMUSCULAR; INTRAVENOUS; SOFT TISSUE PRN
Status: DISCONTINUED | OUTPATIENT
Start: 2022-01-28 | End: 2022-01-28

## 2022-01-28 RX ORDER — AMOXICILLIN 250 MG
1-2 CAPSULE ORAL 2 TIMES DAILY
Qty: 30 TABLET | Refills: 0 | Status: SHIPPED | OUTPATIENT
Start: 2022-01-28

## 2022-01-28 RX ORDER — SODIUM CHLORIDE, SODIUM LACTATE, POTASSIUM CHLORIDE, CALCIUM CHLORIDE 600; 310; 30; 20 MG/100ML; MG/100ML; MG/100ML; MG/100ML
INJECTION, SOLUTION INTRAVENOUS CONTINUOUS
Status: DISCONTINUED | OUTPATIENT
Start: 2022-01-28 | End: 2022-01-28 | Stop reason: HOSPADM

## 2022-01-28 RX ORDER — SODIUM CHLORIDE, SODIUM LACTATE, POTASSIUM CHLORIDE, CALCIUM CHLORIDE 600; 310; 30; 20 MG/100ML; MG/100ML; MG/100ML; MG/100ML
INJECTION, SOLUTION INTRAVENOUS CONTINUOUS
Status: DISCONTINUED | OUTPATIENT
Start: 2022-01-28 | End: 2022-01-29 | Stop reason: HOSPADM

## 2022-01-28 RX ORDER — FENTANYL CITRATE 50 UG/ML
INJECTION, SOLUTION INTRAMUSCULAR; INTRAVENOUS PRN
Status: DISCONTINUED | OUTPATIENT
Start: 2022-01-28 | End: 2022-01-28

## 2022-01-28 RX ORDER — LIDOCAINE 40 MG/G
CREAM TOPICAL
Status: DISCONTINUED | OUTPATIENT
Start: 2022-01-28 | End: 2022-01-28 | Stop reason: HOSPADM

## 2022-01-28 RX ORDER — KETOROLAC TROMETHAMINE 30 MG/ML
INJECTION, SOLUTION INTRAMUSCULAR; INTRAVENOUS PRN
Status: DISCONTINUED | OUTPATIENT
Start: 2022-01-28 | End: 2022-01-28

## 2022-01-28 RX ORDER — CEFAZOLIN SODIUM 2 G/50ML
2 SOLUTION INTRAVENOUS SEE ADMIN INSTRUCTIONS
Status: DISCONTINUED | OUTPATIENT
Start: 2022-01-28 | End: 2022-01-28 | Stop reason: HOSPADM

## 2022-01-28 RX ORDER — BUPIVACAINE HYDROCHLORIDE 5 MG/ML
INJECTION, SOLUTION PERINEURAL PRN
Status: DISCONTINUED | OUTPATIENT
Start: 2022-01-28 | End: 2022-01-28 | Stop reason: HOSPADM

## 2022-01-28 RX ORDER — ONDANSETRON 4 MG/1
4 TABLET, ORALLY DISINTEGRATING ORAL EVERY 30 MIN PRN
Status: DISCONTINUED | OUTPATIENT
Start: 2022-01-28 | End: 2022-01-29 | Stop reason: HOSPADM

## 2022-01-28 RX ORDER — OXYCODONE HYDROCHLORIDE 5 MG/1
5 TABLET ORAL EVERY 4 HOURS PRN
Status: DISCONTINUED | OUTPATIENT
Start: 2022-01-28 | End: 2022-01-29 | Stop reason: HOSPADM

## 2022-01-28 RX ORDER — ACETAMINOPHEN 325 MG/1
650 TABLET ORAL
COMMUNITY
Start: 2021-12-12

## 2022-01-28 RX ADMIN — CEFAZOLIN SODIUM 2 G: 2 SOLUTION INTRAVENOUS at 07:50

## 2022-01-28 RX ADMIN — SODIUM CHLORIDE, SODIUM LACTATE, POTASSIUM CHLORIDE, CALCIUM CHLORIDE: 600; 310; 30; 20 INJECTION, SOLUTION INTRAVENOUS at 07:31

## 2022-01-28 RX ADMIN — Medication 50 MG: at 07:42

## 2022-01-28 RX ADMIN — Medication 0.5 MG: at 07:50

## 2022-01-28 RX ADMIN — ONDANSETRON 4 MG: 2 INJECTION INTRAMUSCULAR; INTRAVENOUS at 09:30

## 2022-01-28 RX ADMIN — PROPOFOL 150 MCG/KG/MIN: 10 INJECTION, EMULSION INTRAVENOUS at 08:12

## 2022-01-28 RX ADMIN — PROPOFOL 150 MCG/KG/MIN: 10 INJECTION, EMULSION INTRAVENOUS at 08:50

## 2022-01-28 RX ADMIN — FENTANYL CITRATE 100 MCG: 50 INJECTION, SOLUTION INTRAMUSCULAR; INTRAVENOUS at 07:41

## 2022-01-28 RX ADMIN — KETOROLAC TROMETHAMINE 15 MG: 30 INJECTION, SOLUTION INTRAMUSCULAR; INTRAVENOUS at 09:30

## 2022-01-28 RX ADMIN — PROPOFOL 200 MG: 10 INJECTION, EMULSION INTRAVENOUS at 07:41

## 2022-01-28 RX ADMIN — PROPOFOL 200 MCG/KG/MIN: 10 INJECTION, EMULSION INTRAVENOUS at 07:41

## 2022-01-28 RX ADMIN — DEXAMETHASONE SODIUM PHOSPHATE 4 MG: 4 INJECTION, SOLUTION INTRA-ARTICULAR; INTRALESIONAL; INTRAMUSCULAR; INTRAVENOUS; SOFT TISSUE at 07:44

## 2022-01-28 RX ADMIN — SODIUM CHLORIDE, SODIUM LACTATE, POTASSIUM CHLORIDE, CALCIUM CHLORIDE: 600; 310; 30; 20 INJECTION, SOLUTION INTRAVENOUS at 06:27

## 2022-01-28 ASSESSMENT — MIFFLIN-ST. JEOR: SCORE: 1833.44

## 2022-01-28 NOTE — TELEPHONE ENCOUNTER
1/28/2022 URETHROPLASTY, USING BUCCAL MUCOSA GRAFT    Pt calling back to report there is drainage around the surgical area and the bandages are all wet.    Pt was told to call if there was any issues.     Called Urology, and transferred the Call to Nurse Solis in the clinic for further assistance.    Jagruti Jackson RN  Central Triage Red Flags/Med Refills      Reason for Disposition    Caller has URGENT question and triager unable to answer question    Additional Information    Negative: Major abdominal surgical incision and wound gaping open with visible internal organs    Negative: Sounds like a life-threatening emergency to the triager    Negative: Bleeding from incision and won't stop after 10 minutes of direct pressure    Negative: Widespread rash and bright red, sunburn-like    Negative: Severe pain in the incision    Negative: Incision gaping open and < 2 days (48 hours) since wound re-opened    Negative: Incision gaping open and length of opening > 2 inches (5 cm)    Negative: Patient sounds very sick or weak to the triager    Negative: Sounds like a serious complication to the triager    Negative: Fever > 100.4 F (38.0 C)    Negative: Incision looks infected (spreading redness, pain)    Negative: Red streak runs from the incision and longer than 1 inch (2.5 cm)    Negative: Pus or bad-smelling fluid draining from incision    Negative: Dressing soaked with blood or body fluid (e.g., drainage)    Negative: Raised bruise and size > 2 inches (5 cm) and expanding    Protocols used: POST-OP INCISION SYMPTOMS AND MZEILEIFX-E-MG

## 2022-01-28 NOTE — TELEPHONE ENCOUNTER
Patients wife explained dressing is saturated with blood Dark and dripping, this is dressing around penis.  Writer asked wife to wrap a towel around dressing snuggly, and hold firm pressure patient alert and orientated and ambulating.  Instructed to come back to [facility]inic to be seen by RN and MD for further evaluation verbal understanding given.  Wife aware of plan / patient stable to make trip in car.  Dr Cortés informed plan, and to be in clinic for evaluation upon patients arrival. Irma Hein RN

## 2022-01-28 NOTE — ANESTHESIA CARE TRANSFER NOTE
Patient: Feng Redding    Procedure: Procedure(s):  URETHROPLASTY, USING BUCCAL MUCOSA GRAFT       Diagnosis: Other stricture of urethra in male [N35.819]  Diagnosis Additional Information: No value filed.    Anesthesia Type:   General     Note:    Oropharynx: oropharynx clear of all foreign objects  Level of Consciousness: awake  Oxygen Supplementation: face mask  Level of Supplemental Oxygen (L/min / FiO2): 4  Independent Airway: airway patency satisfactory and stable  Dentition: dentition unchanged  Vital Signs Stable: post-procedure vital signs reviewed and stable  Report to RN Given: handoff report given  Patient transferred to: PACU  Comments: Vital signs per nursing documentation.     Handoff Report: Identifed the Patient, Identified the Reponsible Provider, Reviewed the pertinent medical history, Discussed the surgical course, Reviewed Intra-OP anesthesia mangement and issues during anesthesia, Set expectations for post-procedure period and Allowed opportunity for questions and acknowledgement of understanding      Vitals:  Vitals Value Taken Time   BP     Temp     Pulse     Resp     SpO2         Electronically Signed By: ESTELA Roldan CRNA  January 28, 2022  9:51 AM

## 2022-01-28 NOTE — BRIEF OP NOTE
[facility] Brief Operative Note    Pre-operative diagnosis: Other stricture of urethra in male [N35.819]   Post-operative diagnosis * No post-op diagnosis entered *  same   Procedure: Procedure(s):  URETHROPLASTY, USING BUCCAL MUCOSA GRAFT (Deneen)   Surgeon(s): Surgeon(s) and Role:     * Lino Cortés MD - Primary     * Enoch Leach MD - Assisting     * Horacio Galloway MD - Resident - Assisting     * Brunilda Magaña MD - Fellow - Assisting   Estimated blood loss: * No values recorded between 1/28/2022  8:03 AM and 1/28/2022  9:35 AM *    Specimens: ID Type Source Tests Collected by Time Destination   1 : Urethral Stricture Tissue Urethra SURGICAL PATHOLOGY EXAM Enoch Leach MD 1/28/2022  9:15 AM       Findings: 2cm fossa navicularis stricture

## 2022-01-28 NOTE — DISCHARGE INSTRUCTIONS
"[facility]  Home Care Following Anesthesia  For 24 hours after surgery:  1. Get plenty of rest.  A responsible adult must stay with you for at least 24 hours after you leave the surgery center.  2. Do not drive or use heavy equipment.  If you have weakness or tingling, don't drive or use heavy equipment until this feeling goes away.   3. Do not drink alcohol.   4. Avoid strenuous or risky activities.  Ask for help when climbing stairs.  5. You may feel lightheaded.  IF so, sit for a few minutes before standing.  Have someone help you get up.   6. If you have nausea (feel sick to your stomach): Drink only clear liquids such as apple juice, ginger ale, broth or 7-Up.  Rest may also help.  Be sure to drink enough fluids.  Move to a regular diet as you feel able.   7. You may have a slight fever.  Call the doctor if your fever is over 100 F (37.7 C) (taken under the tongue) or lasts longer than 24 hours.  8. You may have a dry mouth, a sore throat, muscle aches or trouble sleeping. These should go away after 24 hours.  9. Do not make important or legal decisions.   10. It is recommended to avoid smoking.        Today you received a Marcaine or bupivacaine block to numb the nerves near your surgery site.  This is a block using local anesthetic or \"numbing\" medication injected around the nerves to anesthetize or \"numb\" the area supplied by those nerves.  This block is injected into the muscle layer near your surgical site.  The medication may numb the location where you had surgery for 6-18 hours, but may last up to 24 hours.  If your surgical site is an arm or leg you should be careful with your affected limb, since it is possible to injure your limb without being aware of it due to the numbing.  Until full feeling returns, you should guard against bumping or hitting your limb, and avoid extreme hot or cold temperatures on the skin.  As the block wears off, the feeling will return as " a tingling or prickly sensation near your surgical site.  You will experience more discomfort from your incision as the feeling returns.  You may want to take a pain pill (a narcotic or Tylenol if this was prescribed by your surgeon) when you start to experience mild pain before the pain beccomes more severe.  If your pain medications do not control your pain you should notifiy your surgeon.    Tips for taking pain medications  To get the best pain relief possible, remember these points:    Take pain medications as directed, before pain becomes severe.    Pain medication can upset your stomach: taking it with food may help.    Constipation is a common side effect of pain medication. Drink plenty of  fluids.    Eat foods high in fiber. Take a stool softener if recommended by your doctor or pharmacist.    Do not drink alcohol, drive or operate machinery while taking pain medications.    Ask about other ways to control pain, such as with heat, ice or relaxation.    Tylenol/Acetaminophen Consumption  To help encourage the safe use of acetaminophen, the makers of TYLENOL  have lowered the maximum daily dose for single-ingredient Extra Strength TYLENOL  (acetaminophen) products sold in the [place] from 8 pills per day (4,000 mg) to 6 pills per day (3,000 mg). The dosing interval has also changed from 2 pills every 4-6 hours to 2 pills every 6 hours.    If you feel your pain relief is insufficient, you may take Tylenol/Acetaminophen in addition to your narcotic pain medication.     Be careful not to exceed 3,000 mg of Tylenol/Acetaminophen in a 24 hour period from all sources.    If you are taking extra strength Tylenol/acetaminophen (500 mg), the maximum dose is 6 tablets in 24 hours.    If you are taking regular strength acetaminophen (325 mg), the maximum dose is 9 tablets in 24 hours.    Call a doctor for any of the followin. Signs of infection (fever, growing tenderness at the surgery site, a large amount of  drainage or bleeding, severe pain, foul-smelling drainage, redness, swelling).  2. It has been over 8 to 10 hours since surgery and you are still not able to urinate (pass water).  3. Headache for over 24 hours.  4. Numbness, tingling or weakness the day after surgery (if you had spinal anesthesia).  5. Signs of Covid-19 infection (temperature over 100 degrees, shortness of breath, cough, loss of taste/smell, generalized body aches, persistent headache, chills, sore throat, nausea/vomiting/diarrhea)    Your doctor is:       Dr. Lino Cortés, Prostate and Urology: [phone]               Or dial [phone] and ask for the resident on call for:  Prostate Urology  For emergency care, call the:  [place] [facility]:  [phone] (TTY for hearing impaired: [phone])        Emptying and Cleaning Your Urinary Catheter Bag  You have an indwelling urinary catheter. This drains urine from your bladder into a bag. The bag can be one that is used at your bedside. Or it can be a smaller bag that is strapped to your leg. Follow the steps below to empty and clean a urinary bag.       Drain Clean tube Clean catheter   Step 1. Drain the bag    Wash your hands well with soap and water to prevent infecting the urinary catheter and bag.    If the short drainage tube is inserted into a pocket on the bag, take the drainage tube out of the pocket.    Hold the drainage tube over a toilet or measuring container. Open the valve.    Don t touch the tip of the valve or let it touch the toilet or container.    Wash your hands again.  Step 2. Clean the drainage tube    When the bag is empty, clean the tip of the drainage valve with an alcohol wipe.    Close the valve.    Reinsert the drainage tube into the pocket, if there is one.  Step 3. Clean your skin    Wash your hands well before and after cleaning your skin.    If you have a catheter (such as a Herzog) that enters through the urethra, clean the urethral area with  soap and water 1 time(s) daily as you were taught by your healthcare provider. You should also clean after every bowel movement to prevent infection.  ? Don't pull on the tubing when cleaning so you don t injure the urethra.  ? Don t apply powder to the genital area or to the tubing.    If you have a suprapubic catheter, your healthcare provider will tell you how to clean your skin around the catheter. This is a catheter that was surgically placed into the bladder through the lower abdomen.  Step 4. Check and clean the catheter tubing    Check the tubing. If there are kinks, cracks, clogs, or you can t see into the tubing, you ll need to change to new tubing as you were shown by your healthcare provider.    If the current tubing can still be used, wash it with soap and water. Always wash the tubing in the direction away from your body. Don't pull on the tubing.    Dry the tubing with a clean washcloth or paper towel.  Step 5. Keep the drainage bag clean.    If  you have a catheter in place long term, talk to you provider about if and how often you should clean your drainage bag.  ? If need, clean your drainage bag by following these steps:    Wash your hands well with soap and water.    Disconnect the bag from the catheter tubing. Connect the tubing to the backup bag or drainage device.    Drain any remaining urine from the bag you just disconnected. Close the drainage valve.    Pour some warm (not hot) soapy water into the bag. Swish the soap around, being sure to get the corners of the bag.    Open the drainage valve to drain the soap. Close the valve.  ? Ask your healthcare provider how often you should clean your bag and what solution you should use to reduce odor and keep your bag free of germs.  ? Shake the solution a bit and allow it to remain in the bag for 30 minutes.  ? Drain the solution and rinse the bag with cold tap water.  ? Hang the bag to drain and air-dry.  When to call your healthcare  provider  Call your healthcare provider right away if you have any of the following:    Little or no urine flowing into the bag    Urine leaking where the catheter enters the body    Pain, burning, or redness of the area where the catheter enters the body    Bloody urine (a trace of blood is normal)    Cloudy or foul-smelling urine, or sand-like grains in your urine    Pain in your lower back or lower abdomen    Your catheter falls out    Fever of 100.4 F (38 C) or higher, or as directed by your healthcare provider    Shaking chills      Discharge Instructions: Caring for Your Leg Bag  You are going home with a urinary catheter and collection device (drainage bag) in place. One type of collection device is called a leg bag. This is a smaller drainage bag that you can wear on your leg to collect urine during the day. The bag can fit under your clothing.   Home care    Wash your hands thoroughly before and after you care for your catheter or collection device.    Gather your supplies:  ? Alcohol wipes  ? Soap and water  ? Towel and washcloth  ? Leg strap and leg bag    Use soap and water to wash the area where your catheter enters your body. Rinse well.    Secure the bag trevino to your leg:  ? Put the leg band high on your thigh with the product label pointing away from your leg.  ? Stretch the leg band in place and fasten.  ? Place the catheter tubing over the bag and secure it. You may secure it with a Velcro tab or other method, depending on the product you use. Be sure to leave enough loop in the catheter above the leg band so you won't pull on the tube.  ? Every 4 to 6 hours, reposition the band. This will prevent pressure from the elastic on your leg. You can do this by changing the bag to the other leg or by raising or lowering the leg band.  ? Wash the band as often as needed. You can hand wash and dry the leg band.    Place the bag in the bag trevino.    Clean the urine bag end of the catheter and your  catheter port with an alcohol wipe.    Place a towel under the bag and port to keep urine from dripping onto your leg.    Before connecting the outlet valve at the bottom of the bag to the catheter, make sure that it is firmly closed. Flip the valve upward toward the bag. It needs to snap firmly in place. Be sure not to tug on the tubing. Be gentle.    Attach the urine bag to the end of the catheter. Insert the connector snugly into the catheter port. You can prevent dribbling urine by bending the catheter tubing just below the tip and holding it while you disconnect it from the catheter. Be careful to keep the tip clean while connecting the leg bag tubing to the catheter--this keeps germs from getting into the system.    Drain the bag when it's full. To drain the bag, flip the clamp downward. Direct the flexible outlet tube to control the flow of urine. You don t have to disconnect the leg bag from the catheter to empty it. Raise your leg up to the edge of the toilet to reach the leg bag. Then you can empty the bag directly into the toilet. This way, you won t need to bend over, which may be uncomfortable.    Keep the leg bag clean.    Ask your healthcare provider how often you should clean your bag and what solution you should use ?to reduce odor and keep the bag free of germs.    Shake the solution a bit and allow it to remain in the bag for 30 minutes.      Drain the solution and rinse the bag with cold tap water.    Hang the bag to drain and air dry.    Remember to keep the drainage bag below the level of your bladder for proper drainage.  Follow-up  Make a follow-up appointment as directed by your healthcare provider.  When to call your healthcare provider  Call your healthcare provider right away if you have any of the following:    Redness, swelling, or warmth around the catheter entry site    Pus draining from your catheter entry site or into the catheter tubing and bag    Blood, clots, or floating debris  in the urine    Nausea and vomiting    Shaking chills    Fever above 100.4 F (38 C), or as directed by your healthcare provider    Pain that is not relieved by medicine     Catheter that falls out or is dislodged     Procedure:  Urethroplasty with buccal graft  Pain:  There will be discomfort in the area of the surgery.  If you have a graft done, usually the mouth hurts more than the groin. You will go home with Oxycodone which is a narcotic pain medication which should only be required for the first two to three days -- because the narcotics will cause sleepiness and constipation it is best to stop or reduce them at that time if you can.  After this, using Tylenol and or ibuprofen (Advil/Motrin) as directed on the packaging should be sufficient.  The pain should continue to get better on a daily basis.   You may notice bladder spasms which can present as sharp sudden pains in the area of bladder (just above the pubic hair) or the tip of the penis.  This is usually because the summers catheter irritates the bladder. You may feel like you have to urinate even though the bladder is fully drained. You will be given a prescription (see below) of a medication to be taken as needed, which can help with these. It is important to take the bladder spasm medicine because occasionally these bladder spasms can get intense enough to cause you to urinate around (rather than through) the catheter and this rush of urine can damage the urethroplasty work.   If you had a buccal graft, you will notice some tightness and discomfort in one side of your mouth that may limit how much you can open your mouth.  This should improve over the next several weeks.  Urination:  You will have the summers catheter in your penis to drain your bladder until your follow up in clinic.  This should be secured to your abdomen at all times.  This will not generally restrict your activities, but you should be careful if you are driving such that it does not  become a distraction.  You should apply the bacitracin antibiotic ointment (see below) to the tip of the penis twice a day.  You may notice a little blood, small amount of white discharge, or caking at the tip of the penis with the catheter in place.  This is normal but it can irritate the tip of the penis so it is best to wash this off in the shower.  Diet:  You may return to your normal diet that you were taking before surgery.  If you had a buccal graft used as part of your surgery, you may find soft food more comfortable, but there are no specific restrictions.  Activity:  You can return to your normal level of activity as you are able, based upon your level of pain. Walking and lifting won t hurt the urethroplasty site but use good judgment: if something hurts then don t do it. Most people take about 2 weeks off from work. Depending on whether you have an active job or a desk job you may be able to then go back to work while the catheter is still in. We leave this up to you based on your pain level. If you go back to work, don't expect to be at 100% until after the catheter comes out.  Restrictions:  You should not drive or drink alcoholic beverages while you are taking narcotic medications (see below).  You should not soak in a tub or pool until the catheter is removed.  Daily showering is important.  Wound Care:  You will have an incision between your scrotum and anus (and one in the mouth if you had a graft done). These will be closed with stitches that will dissolve on their own and do not need to be removed.  You will not need any specific bandage on this area, but you may find wearing a small pad in your underwear can help protect from blood staining your underwear.  You can shower and let the water run over the incision.  You should not scrub it with soap.    You can take your dressing off Monday morning   If there is bleeding please hold pressure on your penis  Medications:  1)  Oxycodone- this is a  narcotic pain medication which you should only need for two to three days after surgery.    2) Detrol - this is a bladder spasm medicine which you should take on a daily basis. Please stop the day before catheter removal  3) Bacitracin Ointment - this is an antibiotic ointment which will help with discomfort from the summers cathteter  4) Ciprofloxacin  - this is an antibiotic pill to take the morning of summers catheter removal  5) Senna- this is a stool softener.  The surgery, pain medications, and anticholinergic medications can lead to constipation.  You can stop this or reduce it if you are having loose stools.  Other over the counter solutions such as prune juice, miralax, fiber products, senna, and dulcolax can also be used.  6) Peridex - if you had a buccal graft you will also have this mouth rinse to use every two hours while you are awake, until it is gone to help prevent infection and promote healing.    Follow-up:  Please call our triage nurse at [phone] (and choose option 3) the Monday after your surgery to update us on your progress and so we can answer any questions you have.     Your follow-up appointment in Dr. Cortés's clinic is in one week.    If you have any questions, please call.  1.  Nursing phone helpline at the Urology clinic (8A-5P M-F): [phone]  2.  If after hours, call [phone] and ask to speak with the Urology resident on call.

## 2022-01-28 NOTE — ANESTHESIA POSTPROCEDURE EVALUATION
Patient: Feng Redding    Procedure: Procedure(s):  URETHROPLASTY, USING BUCCAL MUCOSA GRAFT       Diagnosis:Other stricture of urethra in male [N35.819]  Diagnosis Additional Information: No value filed.    Anesthesia Type:  General    Note:  Disposition: Outpatient   Postop Pain Control: Uneventful            Sign Out: Well controlled pain   PONV: No   Neuro/Psych: Uneventful            Sign Out: Acceptable/Baseline neuro status   Airway/Respiratory: Uneventful            Sign Out: Acceptable/Baseline resp. status   CV/Hemodynamics: Uneventful            Sign Out: Acceptable CV status; No obvious hypovolemia; No obvious fluid overload   Other NRE: NONE   DID A NON-ROUTINE EVENT OCCUR? No           Last vitals:  Vitals Value Taken Time   /91 01/28/22 1015   Temp 37  C (98.6  F) 01/28/22 0955   Pulse 88 01/28/22 1016   Resp 8 01/28/22 1016   SpO2 94 % 01/28/22 1016   Vitals shown include unvalidated device data.    Electronically Signed By: Ricardo Elias MD  January 28, 2022  11:54 AM

## 2022-01-28 NOTE — OP NOTE
OPERATIVE REPORT  DATE: 1/28/22    PREOPERATIVE DIAGNOSIS: Fossa navicularis urethral stricture (3 cm) .   POSTOPERATIVE DIAGNOSIS: Same  PROCEDURES:   1. Brunswick of buccal mucosa graft from the left oral cavity (1.5 cm wide x 2.5 cm long)  2. Transurethral ventral buccal mucosa graft inlay urethroplasty (1.5 cm wide x 2.5 cm long).   3. Cystoscopy    SURGEON: Lino Cortés MD  ASSISTANT SURGEON: Enoch Leach MD, MS  FELLOW: Brunilda Magaña MD  RESIDENT: Horacio Galloway MD  ESTIMATED BLOOD LOSS: 15 mL  SPECIMENS TO PATHOLOGY: Urethral stricture    SIGNIFICANT FINDINGS:   1. 2 cm fossa navicularis stricture  2. Urethra calibrated to a 26 Fr Bougie after resection of strictured urethral segment    INDICATIONS: Mr. Feng Redding is a 36 year old gentleman with a fossa navicularis stricture. The risks, benefits and alternatives of the multiple treatment options were described and the patient wished to proceed with urethroplasty with buccal graft.     DESCRIPTION OF PROCEDURE:   After informed consent was obtained and preoperative antibiotics were given, the patient was taken to the operating room and placed supine on the operating table. General anesthesia was induced. He was intubated. The groin was prepped and draped in the usual sterile fashion. The mouth was prepped and draped in the usual sterile fashion.     On examination there was clear narrowing just beyond the urethral meatus and the lumen beyond was not clearly visualized. We placed three holding sutures of 4-0 vicryl through skin of glans and urethral mucosa to pull the fossa into view. The perineal Bookwalter was placed to aid in retraction of these sutures. A Platinum blade was used to make a initial ventral incision along the stricture mucosa of the fossa.  After initial cut the urethral lumen could be identified and we passed a Sensor guidewire past the level of stricture and into the proximal urethra.  The wire and our sutures were used for  retraction to continue our cuts at the ventral aspect of the fossa navicularis to excise stricture distal to proximal. Once the tightest area of stricture was seen, we used a [place] blade to ultimately excise a triangular section of urethral mucosa from the ventral surface of the urethra without violating the ventral penile skin.  A portion of this stricture was passed off for pathology. During the excision of the stricture we did place a tourniquet using a vessel loop at the base of the penis to improve visualization within the field. We also made an approximate 3 mm ventral meatotomy in order to visualize the proximal most portion of the stricture. During this portion, I asked Dr. Leach to scrub into the operating room for assistance with visualization and dissection given his expertise and experience in reconstructive urology.  During resection of the stricture we intermittently passed bougie sounds to determine diameter of the urethral lumen.  Ultimately the urethral lumen dilated up to 26 Thai bougie easily after resection of the entirety of the stricture.  The most proximal aspect of the resection was about 2 cm proximal to the urethral meatus.    Next, we turned our attention to harvesting a buccal mucosal graft. A 2.5 cm x 1.5 cm buccal graft was harvested from the left oral cavity in the standard fashion. Three holding sutures of 2-0 silk were placed in the vermillion border of the lip. The Steinhauser buccal mucosa retractor was put in place. The graft was marked out with calipers and hydrodissection was achieved with 0.5% Marcaine with 1:200,000 epinephrine. The graft was sharply harvested with a #15-blade scalpel taking care to avoid Nghia's duct and leave the buccinator muscle down with the patient. The graft was defatted and tapered on the back table using a silicone block. The buccal mucosa was reapproximated in the mouth in a Z-plasty fashion using a running 4-0 chromic suture.      The ventral  "aspect of the fossa navicularis was prepared for grafting by ensuring that it was free of any overlying tissue and our stay sutures allowed for maximal retraction. The graft was brought into the field. A 6-0 PDS double armed suture was passed through the apex of the graft, both needles were passed in a \"top-down\" fashion. They were then passed transurethrally at the most proximal apex of the urethrotomy wound bed and out through the ventral penile skin. Once both needles were passed through the skin they were tied to each other so that the graft was \"parachuted\" into place. The lateral aspects of the graft were secured to the parallel native urethral mucosa with several 6-0 PDS interrupted sutures. Two additional double armed 6-0 PDS sutures were placed transurethrally to the left and right along the course of the graft and out the lateral aspects of the ventral penile skin in a similar fashion as the first stitch. These were tied to the ventral penile skin to quilt the graft and support it in place. Lastly, the distal aspect of the graft was trimmed to align with the native urethral mucosa mucocutaneous junction of the urethral meatus. The graft was approximated to this junction with several 5-0 vicryl simple interrupted sutures.    A flexible cystoscope was advanced through the urethral opening. No additional urethral stricture was seen. The voluntary sphincter was intact. The prostate did not demonstrate any significant hypertrophy. The bladder stones was free of any tumors, stones, or diverticuli.  The media was semi-turbid with some mild debris.  There were minimal trabeculations. Bilateral ureteral orifices were in their orthotopic positions. The scope was removed. Upon exit, the graft was noted to be well approximated to the urethrotomy. A 16 Fr summers urethral catheter was placed and 10 ml sterile water put in the balloon    Surgicel powder was placed at the urethral meatus due to some oozing at the graft " site along the suture line. Next, bacitracin, Gerson gauze wrap, and coban dressing were applied along penile shaft extending up to the summers catheter to act as a pressure dressing. The patient was awakened from anesthesia, extubated, and transferred to the postanesthesia care unit in stable condition. There were no complications.     POST-OP PLAN:  -Catheter x 1 week  -Follow up as scheduled in clinic on 2/4/22    Horacio Galloway MD  Urology, PGY-4  (p) [phone]    As attending surgeon, I, Lino Cortés MD, was scrubbed and present for the entire procedure.

## 2022-01-28 NOTE — ANESTHESIA PREPROCEDURE EVALUATION
Anesthesia Pre-Procedure Evaluation    Patient: Feng Redding   MRN: [id number] : 1985        Preoperative Diagnosis: Other stricture of urethra in male [N35.819]    Procedure : Procedure(s):  URETHROPLASTY, USING BUCCAL MUCOSA GRAFT          Past Medical History:   Diagnosis Date     Hypertension      LESLIE (obstructive sleep apnea)       History reviewed. No pertinent surgical history.   Allergies   Allergen Reactions     Apples [Apple]      Cherry      Peaches [Prunus Persica]      Pear       Social History     Tobacco Use     Smoking status: Never Smoker     Smokeless tobacco: Former User     Types: Chew     Tobacco comment: Quit oral tobacco 2021   Substance Use Topics     Alcohol use: Yes      Wt Readings from Last 1 Encounters:   22 90.7 kg (200 lb)        Anesthesia Evaluation            ROS/MED HX  ENT/Pulmonary:     (+) sleep apnea,     Neurologic:       Cardiovascular:     (+) hypertension-----    METS/Exercise Tolerance:     Hematologic:       Musculoskeletal:       GI/Hepatic:       Renal/Genitourinary:       Endo:       Psychiatric/Substance Use:       Infectious Disease:       Malignancy:       Other:            Physical Exam    Airway        Mallampati: II   TM distance: > 3 FB      Respiratory Devices and Support         Dental           Cardiovascular          Rhythm and rate: regular     Pulmonary           breath sounds clear to auscultation           OUTSIDE LABS:  CBC: No results found for: WBC, HGB, HCT, PLT  BMP:   Lab Results   Component Value Date     2021     2021    POTASSIUM 4.3 2021    POTASSIUM 4.4 2021    CHLORIDE 106 2021    CHLORIDE 107 2021    CO2 28 2021    CO2 26 2021    BUN 15 2021    BUN 20 2021    CR 0.84 2021    CR 1.04 2021    GLC 94 2021    GLC 93 2021     COAGS: No results found for: PTT, INR, FIBR  POC: No results found for: BGM, HCG, HCGS  HEPATIC:    Lab Results   Component Value Date    ALBUMIN 3.9 02/17/2021    PROTTOTAL 7.6 02/17/2021    ALT 51 02/17/2021    AST 24 02/17/2021    ALKPHOS 85 02/17/2021    BILITOTAL 0.6 02/17/2021     OTHER:   Lab Results   Component Value Date    A1C 5.2 02/17/2021    SHANNON 9.1 12/09/2021       Anesthesia Plan    ASA Status:  2   NPO Status:  NPO Appropriate    Anesthesia Type: General.     - Airway: ETT   Induction: Intravenous.   Maintenance: TIVA.        Consents    Anesthesia Plan(s) and associated risks, benefits, and realistic alternatives discussed. Questions answered and patient/representative(s) expressed understanding.    - Discussed:     - Discussed with:  Patient         Postoperative Care    Pain management: Oral pain medications, IV analgesics, Multi-modal analgesia.   PONV prophylaxis: Ondansetron (or other 5HT-3), Dexamethasone or Solumedrol     Comments:           H&P reviewed: Unable to attach H&P to encounter due to EHR limitations. H&P Update: appropriate H&P reviewed, patient examined. No interval changes since H&P (within 30 days).         Ricardo Elias MD

## 2022-01-31 LAB
PATH REPORT.COMMENTS IMP SPEC: NORMAL
PATH REPORT.FINAL DX SPEC: NORMAL
PATH REPORT.GROSS SPEC: NORMAL
PATH REPORT.MICROSCOPIC SPEC OTHER STN: NORMAL
PATH REPORT.RELEVANT HX SPEC: NORMAL
PHOTO IMAGE: NORMAL

## 2022-01-31 PROCEDURE — 88305 TISSUE EXAM BY PATHOLOGIST: CPT | Mod: 26 | Performed by: PATHOLOGY

## 2022-02-04 ENCOUNTER — OFFICE VISIT (OUTPATIENT)
Dept: UROLOGY | Facility: CLINIC | Age: 37
End: 2022-02-04
Payer: COMMERCIAL

## 2022-02-04 VITALS
HEIGHT: 70 IN | DIASTOLIC BLOOD PRESSURE: 92 MMHG | HEART RATE: 68 BPM | BODY MASS INDEX: 28.63 KG/M2 | WEIGHT: 200 LBS | SYSTOLIC BLOOD PRESSURE: 132 MMHG

## 2022-02-04 DIAGNOSIS — N35.819 OTHER STRICTURE OF URETHRA IN MALE: Primary | ICD-10-CM

## 2022-02-04 PROCEDURE — 99024 POSTOP FOLLOW-UP VISIT: CPT | Performed by: UROLOGY

## 2022-02-04 ASSESSMENT — PAIN SCALES - GENERAL: PAINLEVEL: NO PAIN (0)

## 2022-02-04 ASSESSMENT — MIFFLIN-ST. JEOR: SCORE: 1833.44

## 2022-02-04 NOTE — PROGRESS NOTES
36 year old male with history of ventral inlay urethroplasty 1 week ago with buccal graft.  He is doing well  Had some immediate postop bleeding which has resolved.  Here for summers removal.  Took cipro this morning.    Physical exam reveals well healed urethral meatus with summers in place. Scab in place.    A/p:  Voiding trial performed today after urethroplasty for urethral stricture:    Feng Redding comes into clinic today at the request of Dr. Lino Cortés for a post urethroplasty trial of void.    Patient has taken ciprofloxacin before coming into the clinic for the trial. No additional medication was given.      Approx 100 mL of sterile water instilled into the bladder via catheter.      Removal:  16 Fr straight tipped silicone summers catheter removed from urethral meatus without difficulty after removing 10 mL of fluid from the balloon, balloon intact.    Patient voided approx 200 mL of yellow urine.     Post-void residual was: 20 mL per bladder scan.    Patient tolerated procedure well.      Education: Teaching done with patient verbally as to where to go or call  if unable to urinate post-catheter removal. Increase fluids.   Plan: Follow-up in 3 months per Dr. Cortés      This service provided today was under the supervising provider of the day Dr. Lino Cortés, who was available if needed.    Pham Artis, EMT        He will followup in 3 months for Bougie assessment.  Sooner if he notes obstructive symptoms.    Lino Cortés MD

## 2022-02-04 NOTE — NURSING NOTE
"Chief Complaint   Patient presents with     Surgical Followup     s/p urethroplasty       Blood pressure (!) 132/92, pulse 68, height 1.778 m (5' 10\"), weight 90.7 kg (200 lb). Body mass index is 28.7 kg/m .    Patient Active Problem List   Diagnosis     Overweight (BMI 25.0-29.9)     LESLIE (obstructive sleep apnea)     Obesity (BMI 30.0-34.9)     Elevated BP without diagnosis of hypertension     Nocturia     Acute UTI     Postprocedural male urethral stricture       Allergies   Allergen Reactions     Apples [Apple]      Cherry      Peaches [Prunus Persica]      Pear        Current Outpatient Medications   Medication Sig Dispense Refill     acetaminophen (TYLENOL) 325 MG tablet Take 650 mg by mouth       chlorhexidine (PERIDEX) 0.12 % solution Swish and spit 15 mLs in mouth 2 times daily for 7 days 210 mL 0     ciprofloxacin (CIPRO) 500 MG tablet Take 1 tablet (500 mg) by mouth 2 times daily Please take the morning of your catheter removal appointment 1 tablet 0     losartan (COZAAR) 25 MG tablet Take 1 tablet (25 mg) by mouth daily 60 tablet 0     oxyCODONE (ROXICODONE) 5 MG tablet Take 1-2 tablets (5-10 mg) by mouth every 4 hours as needed for moderate to severe pain 10 tablet 0     senna-docusate (SENOKOT-S/PERICOLACE) 8.6-50 MG tablet Take 1-2 tablets by mouth 2 times daily To prevent constipation 30 tablet 0     tolterodine ER (DETROL LA) 4 MG 24 hr capsule Take 1 capsule (4 mg) by mouth daily Stop taking the day before catheter removal 6 capsule 0       Social History     Tobacco Use     Smoking status: Never Smoker     Smokeless tobacco: Former User     Types: Chew     Tobacco comment: Quit oral tobacco March 2021   Vaping Use     Vaping Use: Never used   Substance Use Topics     Alcohol use: Yes     Drug use: Never       Pham Artis, EMT  2/4/2022  10:35 AM  "

## 2022-02-04 NOTE — LETTER
2/4/2022       RE: Feng Redding  [address] [place] [address]     Dear Colleague,    Thank you for referring your patient, Feng Redding, to the [facility] [place] [facility]. Please see a copy of my visit note below.    36 year old male with history of ventral inlay urethroplasty 1 week ago with buccal graft.  He is doing well  Had some immediate postop bleeding which has resolved.  Here for summers removal.  Took cipro this morning.    Physical exam reveals well healed urethral meatus with summers in place. Scab in place.    A/p:  Voiding trial performed today after urethroplasty for urethral stricture:    Feng Redding comes into clinic today at the request of Dr. Lino Cortés for a post urethroplasty trial of void.    Patient has taken ciprofloxacin before coming into the clinic for the trial. No additional medication was given.      Approx 100 mL of sterile water instilled into the bladder via catheter.      Removal:  16 Fr straight tipped silicone summers catheter removed from urethral meatus without difficulty after removing 10 mL of fluid from the balloon, balloon intact.    Patient voided approx 200 mL of yellow urine.     Post-void residual was: 20 mL per bladder scan.    Patient tolerated procedure well.      Education: Teaching done with patient verbally as to where to go or call  if unable to urinate post-catheter removal. Increase fluids.   Plan: Follow-up in 3 months per Dr. Cortés      This service provided today was under the supervising provider of the day Dr. iLno Cortés, who was available if needed.    Pham Artis, EMT    He will followup in 3 months for Bougie assessment.  Sooner if he notes obstructive symptoms.    Lino Cortés MD

## 2022-02-04 NOTE — PATIENT INSTRUCTIONS
Please call [phone] to schedule a follow up appointment in 3 months with Dr. Cortés.    It was a pleasure meeting with you today.  Thank you for allowing me and my team the privilege of caring for you today.  YOU are the reason we are here, and I truly hope we provided you with the excellent service you deserve.  Please let us know if there is anything else we can do for you so that we can be sure you are leaving completely satisfied with your care experience.

## 2022-04-09 ENCOUNTER — HEALTH MAINTENANCE LETTER (OUTPATIENT)
Age: 37
End: 2022-04-09

## 2022-10-10 ENCOUNTER — HEALTH MAINTENANCE LETTER (OUTPATIENT)
Age: 37
End: 2022-10-10

## 2023-05-27 ENCOUNTER — HEALTH MAINTENANCE LETTER (OUTPATIENT)
Age: 38
End: 2023-05-27

## 2024-08-04 ENCOUNTER — HEALTH MAINTENANCE LETTER (OUTPATIENT)
Age: 39
End: 2024-08-04

## (undated) DEVICE — CATH FOLYSIL 16FR 15ML AA6116

## (undated) DEVICE — SOL NACL 0.9% INJ 1000ML BAG 2B1324X

## (undated) DEVICE — SU MONOCRYL 4-0 PS-2 27" UND Y426H

## (undated) DEVICE — KIT ENDO FIRST STEP DISINFECTANT 200ML W/POUCH EP-4

## (undated) DEVICE — SOL NACL 0.9% IRRIG 500ML BOTTLE 2F7123

## (undated) DEVICE — TUBING SUCTION 12"X1/4" N612

## (undated) DEVICE — PAD CHUX UNDERPAD 30X30"

## (undated) DEVICE — SU VICRYL 4-0 RB-1 27" UND J214H

## (undated) DEVICE — DRAPE LEGGINGS CLEAR 8430

## (undated) DEVICE — SUCTION MANIFOLD NEPTUNE 2 SYS 4 PORT 0702-020-000

## (undated) DEVICE — ENDO SEAL BX PORT BPS-A

## (undated) DEVICE — SURGICEL POWDER ABSORBABLE HEMOSTAT 3GM 3013SP

## (undated) DEVICE — JELLY LUBRICATING SURGILUBE 2OZ TUBE

## (undated) DEVICE — DRAPE STERI TOWEL LG 1010

## (undated) DEVICE — SU ETHILON 2-0 PS 18" 585H

## (undated) DEVICE — PACK ENT MINOR CUSTOM ASC

## (undated) DEVICE — DRSG KERLIX FLUFFS X5

## (undated) DEVICE — Device

## (undated) DEVICE — TUBING IRRIG CYSTO/BLADDER SET 81" LF 2C4040

## (undated) DEVICE — BAG URINARY DRAIN LUBRISIL IC 4000ML LF 253509A

## (undated) DEVICE — CATH PLUG W/CAP 000076

## (undated) DEVICE — TOOTHBRUSH ADULT NON STERILE MDS136850

## (undated) DEVICE — DRAPE TIBURON TOP SHEET 100X60" 29352

## (undated) DEVICE — ESU GROUND PAD ADULT W/CORD E7507

## (undated) DEVICE — SU PDS II 5-0 RB-1 27" Z303H

## (undated) DEVICE — GLOVE PROTEXIS W/NEU-THERA 7.5  2D73TE75

## (undated) DEVICE — SU SILK 2-0 SH CR 5X18" C0125

## (undated) DEVICE — LINEN TOWEL PACK X5 5464

## (undated) DEVICE — PREP SKIN SCRUB TRAY 4461A

## (undated) DEVICE — SYR BULB IRRIG 50ML LATEX FREE 0035280

## (undated) DEVICE — SYR 50ML LL W/O NDL 309653

## (undated) DEVICE — SU CHROMIC 4-0 RB-1 27" U203H

## (undated) DEVICE — SUTURE BOOTS 051003PBX

## (undated) DEVICE — DRAPE POUCH INSTRUMENT 1018

## (undated) DEVICE — PREP CHLORAPREP 26ML TINTED ORANGE  260815

## (undated) DEVICE — CONNECTOR WATER VALVE PERFUSION PACK STR 020272801

## (undated) DEVICE — BLADE KNIFE BEAVER MINI BEAVER6700

## (undated) DEVICE — SU VICRYL 3-0 SH 27" UND J416H

## (undated) DEVICE — PREP POVIDONE IODINE SOLUTION 10% 4OZ BOTTLE 29906-004

## (undated) DEVICE — SOL WATER IRRIG 500ML BOTTLE 2F7113

## (undated) DEVICE — DRAPE U SPLIT 74X120" 29440

## (undated) DEVICE — RX BACITRACIN OINTMENT 0.9G 1/32OZ 01680 11109

## (undated) DEVICE — DRAPE POUCH IRR 1016

## (undated) DEVICE — BLADE CLIPPER SGL USE 9680

## (undated) RX ORDER — HYDROMORPHONE HYDROCHLORIDE 1 MG/ML
INJECTION, SOLUTION INTRAMUSCULAR; INTRAVENOUS; SUBCUTANEOUS
Status: DISPENSED
Start: 2022-01-28

## (undated) RX ORDER — CEFAZOLIN SODIUM 2 G/50ML
SOLUTION INTRAVENOUS
Status: DISPENSED
Start: 2022-01-28

## (undated) RX ORDER — BUPIVACAINE HYDROCHLORIDE 5 MG/ML
INJECTION, SOLUTION EPIDURAL; INTRACAUDAL
Status: DISPENSED
Start: 2022-01-28

## (undated) RX ORDER — CHLORHEXIDINE GLUCONATE ORAL RINSE 1.2 MG/ML
SOLUTION DENTAL
Status: DISPENSED
Start: 2022-01-28

## (undated) RX ORDER — PROPOFOL 10 MG/ML
INJECTION, EMULSION INTRAVENOUS
Status: DISPENSED
Start: 2022-01-28

## (undated) RX ORDER — PROPOFOL 10 MG/ML
INJECTION, EMULSION INTRAVENOUS
Status: DISPENSED
Start: 2021-12-13

## (undated) RX ORDER — LIDOCAINE HYDROCHLORIDE 20 MG/ML
JELLY TOPICAL
Status: DISPENSED
Start: 2021-11-30

## (undated) RX ORDER — EPINEPHRINE 1 MG/ML
INJECTION, SOLUTION INTRAMUSCULAR; SUBCUTANEOUS
Status: DISPENSED
Start: 2022-01-28

## (undated) RX ORDER — KETOROLAC TROMETHAMINE 30 MG/ML
INJECTION, SOLUTION INTRAMUSCULAR; INTRAVENOUS
Status: DISPENSED
Start: 2022-01-28

## (undated) RX ORDER — LIDOCAINE HYDROCHLORIDE 20 MG/ML
INJECTION, SOLUTION EPIDURAL; INFILTRATION; INTRACAUDAL; PERINEURAL
Status: DISPENSED
Start: 2022-01-28

## (undated) RX ORDER — ONDANSETRON 2 MG/ML
INJECTION INTRAMUSCULAR; INTRAVENOUS
Status: DISPENSED
Start: 2022-01-28

## (undated) RX ORDER — LIDOCAINE HYDROCHLORIDE 20 MG/ML
INJECTION, SOLUTION EPIDURAL; INFILTRATION; INTRACAUDAL; PERINEURAL
Status: DISPENSED
Start: 2021-12-13

## (undated) RX ORDER — DEXAMETHASONE SODIUM PHOSPHATE 4 MG/ML
INJECTION, SOLUTION INTRA-ARTICULAR; INTRALESIONAL; INTRAMUSCULAR; INTRAVENOUS; SOFT TISSUE
Status: DISPENSED
Start: 2022-01-28

## (undated) RX ORDER — FENTANYL CITRATE 50 UG/ML
INJECTION, SOLUTION INTRAMUSCULAR; INTRAVENOUS
Status: DISPENSED
Start: 2022-01-28

## (undated) RX ORDER — GENTAMICIN 40 MG/ML
INJECTION, SOLUTION INTRAMUSCULAR; INTRAVENOUS
Status: DISPENSED
Start: 2022-01-28